# Patient Record
Sex: MALE | Race: WHITE | NOT HISPANIC OR LATINO | URBAN - METROPOLITAN AREA
[De-identification: names, ages, dates, MRNs, and addresses within clinical notes are randomized per-mention and may not be internally consistent; named-entity substitution may affect disease eponyms.]

---

## 2018-06-14 ENCOUNTER — CONVERSION ENCOUNTER (OUTPATIENT)
Dept: OTHER | Facility: HOSPITAL | Age: 78
End: 2018-06-14

## 2018-06-20 ENCOUNTER — OFFICE VISIT CONVERTED (OUTPATIENT)
Dept: OTHER | Facility: HOSPITAL | Age: 78
End: 2018-06-20
Attending: NURSE PRACTITIONER

## 2018-08-20 ENCOUNTER — OFFICE VISIT CONVERTED (OUTPATIENT)
Dept: OTHER | Facility: HOSPITAL | Age: 78
End: 2018-08-20
Attending: NURSE PRACTITIONER

## 2018-08-20 ENCOUNTER — CONVERSION ENCOUNTER (OUTPATIENT)
Dept: OTHER | Facility: HOSPITAL | Age: 78
End: 2018-08-20

## 2018-10-18 ENCOUNTER — CONVERSION ENCOUNTER (OUTPATIENT)
Dept: OTHER | Facility: HOSPITAL | Age: 78
End: 2018-10-18

## 2018-10-18 ENCOUNTER — OFFICE VISIT CONVERTED (OUTPATIENT)
Dept: OTHER | Facility: HOSPITAL | Age: 78
End: 2018-10-18
Attending: NURSE PRACTITIONER

## 2018-12-19 ENCOUNTER — OFFICE VISIT CONVERTED (OUTPATIENT)
Dept: OTHER | Facility: HOSPITAL | Age: 78
End: 2018-12-19
Attending: NURSE PRACTITIONER

## 2019-01-03 ENCOUNTER — CONVERSION ENCOUNTER (OUTPATIENT)
Dept: OTHER | Facility: HOSPITAL | Age: 79
End: 2019-01-03

## 2019-01-03 ENCOUNTER — OFFICE VISIT CONVERTED (OUTPATIENT)
Dept: OTHER | Facility: HOSPITAL | Age: 79
End: 2019-01-03
Attending: NURSE PRACTITIONER

## 2019-01-03 ENCOUNTER — HOSPITAL ENCOUNTER (OUTPATIENT)
Dept: OTHER | Facility: HOSPITAL | Age: 79
Discharge: HOME | End: 2019-01-03
Attending: NURSE PRACTITIONER

## 2019-01-03 LAB
BASOPHILS # BLD AUTO: 0.02 10*3/UL (ref 0–0.2)
BASOPHILS NFR BLD AUTO: 0.23 % (ref 0–3)
EOSINOPHIL # BLD AUTO: 0.5 10*3/UL (ref 0–0.7)
EOSINOPHIL # BLD AUTO: 4.96 % (ref 0–7)
ERYTHROCYTE [DISTWIDTH] IN BLOOD BY AUTOMATED COUNT: 19.6 % (ref 11.5–14.5)
HBA1C MFR BLD: 9.03 G/DL (ref 14–18)
HCT VFR BLD AUTO: 28.1 % (ref 42–52)
LYMPHOCYTES # BLD AUTO: 1.58 10*3/UL (ref 1–5)
MCH RBC QN AUTO: 25.4 PG (ref 27–31)
MCHC RBC AUTO-ENTMCNC: 32.2 G/DL (ref 33–37)
MCV RBC AUTO: 79 FL (ref 80–96)
MONOCYTES # BLD AUTO: 0.75 10*3/UL (ref 0.2–1.2)
MONOCYTES NFR BLD AUTO: 7.41 % (ref 3–10)
NEUTROPHILS # BLD AUTO: 7.25 10*3/UL (ref 2–8)
NEUTROPHILS NFR BLD AUTO: 71.8 % (ref 30–85)
NRBC BLD AUTO-RTO: 0 % (ref 0–0.01)
PLATELET # BLD AUTO: 339 10*3/UL (ref 130–400)
PMV BLD AUTO: 7.3 FL (ref 7.4–10.4)
RBC # BLD AUTO: 3.55 10*6/UL (ref 4.7–6.1)
VARIANT LYMPHS NFR BLD MANUAL: 15.6 % (ref 20–45)
WBC # BLD AUTO: 10.1 10*3/UL (ref 4.8–10.8)

## 2019-02-20 ENCOUNTER — OFFICE VISIT CONVERTED (OUTPATIENT)
Dept: OTHER | Facility: HOSPITAL | Age: 79
End: 2019-02-20
Attending: NURSE PRACTITIONER

## 2019-02-20 ENCOUNTER — HOSPITAL ENCOUNTER (OUTPATIENT)
Dept: OTHER | Facility: HOSPITAL | Age: 79
Discharge: HOME | End: 2019-02-20
Attending: NURSE PRACTITIONER

## 2019-02-20 LAB
BASOPHILS # BLD AUTO: 0.03 10*3/UL (ref 0–0.2)
BASOPHILS NFR BLD AUTO: 0.4 % (ref 0–3)
C3 FRG RBC-MCNC: NORMAL
CONV ABS IMM GRAN: 0.05 10*3/UL (ref 0–0.2)
CONV ANISOCYTES: NORMAL
CONV HYPOCHROMIA IN BLOOD BY LIGHT MICROSCOPY: SLIGHT
CONV IMMATURE GRAN: 0.7 % (ref 0–1.8)
DEPRECATED RDW RBC AUTO: ABNORMAL FL (ref 35.1–43.9)
EOSINOPHIL # BLD AUTO: 0.23 10*3/UL (ref 0–0.7)
EOSINOPHIL # BLD AUTO: 3.3 % (ref 0–7)
ERYTHROCYTE [DISTWIDTH] IN BLOOD BY AUTOMATED COUNT: ABNORMAL % (ref 11.6–14.4)
HBA1C MFR BLD: 11.5 G/DL (ref 14–18)
HCT VFR BLD AUTO: 38.7 % (ref 42–52)
LYMPHOCYTES # BLD AUTO: 1.49 10*3/UL (ref 1–5)
MACROCYTES BLD QL SMEAR: SLIGHT
MCH RBC QN AUTO: 27 PG (ref 27–31)
MCHC RBC AUTO-ENTMCNC: 29.7 G/DL (ref 33–37)
MCV RBC AUTO: 90.8 FL (ref 80–96)
MICROCYTES BLD QL: NORMAL
MONOCYTES # BLD AUTO: 0.59 10*3/UL (ref 0.2–1.2)
MONOCYTES NFR BLD AUTO: 8.4 % (ref 3–10)
NEUTROPHILS # BLD AUTO: 4.61 10*3/UL (ref 2–8)
NEUTROPHILS NFR BLD AUTO: 65.9 % (ref 30–85)
NRBC CBCN: 0 % (ref 0–0.7)
OVALOCYTES BLD QL SMEAR: SLIGHT
PLATELET # BLD AUTO: 306 10*3/UL (ref 130–400)
PMV BLD AUTO: 10.7 FL (ref 9.4–12.4)
POIKILOCYTOSIS BLD QL SMEAR: SLIGHT
RBC # BLD AUTO: 4.26 10*6/UL (ref 4.7–6.1)
VARIANT LYMPHS NFR BLD MANUAL: 21.3 % (ref 20–45)
WBC # BLD AUTO: 7 10*3/UL (ref 4.8–10.8)

## 2019-04-18 ENCOUNTER — HOSPITAL ENCOUNTER (OUTPATIENT)
Dept: OTHER | Facility: HOSPITAL | Age: 79
Discharge: HOME | End: 2019-04-18
Attending: NURSE PRACTITIONER

## 2019-04-18 ENCOUNTER — CONVERSION ENCOUNTER (OUTPATIENT)
Dept: OTHER | Facility: HOSPITAL | Age: 79
End: 2019-04-18

## 2019-04-18 ENCOUNTER — OFFICE VISIT CONVERTED (OUTPATIENT)
Dept: OTHER | Facility: HOSPITAL | Age: 79
End: 2019-04-18
Attending: NURSE PRACTITIONER

## 2019-04-18 LAB
BASOPHILS # BLD AUTO: 0.04 10*3/UL (ref 0–0.2)
BASOPHILS NFR BLD AUTO: 0.6 % (ref 0–3)
CONV ABS IMM GRAN: 0.09 10*3/UL (ref 0–0.2)
CONV IMMATURE GRAN: 1.3 % (ref 0–1.8)
DEPRECATED RDW RBC AUTO: 62.8 FL (ref 35.1–43.9)
EOSINOPHIL # BLD AUTO: 0.26 10*3/UL (ref 0–0.7)
EOSINOPHIL # BLD AUTO: 3.8 % (ref 0–7)
ERYTHROCYTE [DISTWIDTH] IN BLOOD BY AUTOMATED COUNT: 18.2 % (ref 11.6–14.4)
HBA1C MFR BLD: 13.3 G/DL (ref 14–18)
HCT VFR BLD AUTO: 43.2 % (ref 42–52)
LYMPHOCYTES # BLD AUTO: 1.47 10*3/UL (ref 1–5)
MCH RBC QN AUTO: 28.5 PG (ref 27–31)
MCHC RBC AUTO-ENTMCNC: 30.8 G/DL (ref 33–37)
MCV RBC AUTO: 92.7 FL (ref 80–96)
MONOCYTES # BLD AUTO: 0.55 10*3/UL (ref 0.2–1.2)
MONOCYTES NFR BLD AUTO: 8.1 % (ref 3–10)
NEUTROPHILS # BLD AUTO: 4.35 10*3/UL (ref 2–8)
NEUTROPHILS NFR BLD AUTO: 64.5 % (ref 30–85)
NRBC CBCN: 0 % (ref 0–0.7)
PLATELET # BLD AUTO: 251 10*3/UL (ref 130–400)
PMV BLD AUTO: 10.2 FL (ref 9.4–12.4)
RBC # BLD AUTO: 4.66 10*6/UL (ref 4.7–6.1)
VARIANT LYMPHS NFR BLD MANUAL: 21.7 % (ref 20–45)
WBC # BLD AUTO: 6.76 10*3/UL (ref 4.8–10.8)

## 2019-06-12 ENCOUNTER — HOSPITAL ENCOUNTER (OUTPATIENT)
Dept: OTHER | Facility: HOSPITAL | Age: 79
Discharge: HOME | End: 2019-06-12
Attending: NURSE PRACTITIONER

## 2019-06-12 ENCOUNTER — CONVERSION ENCOUNTER (OUTPATIENT)
Dept: OTHER | Facility: HOSPITAL | Age: 79
End: 2019-06-12

## 2019-06-12 ENCOUNTER — OFFICE VISIT CONVERTED (OUTPATIENT)
Dept: OTHER | Facility: HOSPITAL | Age: 79
End: 2019-06-12
Attending: NURSE PRACTITIONER

## 2019-06-12 LAB
ALBUMIN SERPL-MCNC: 4.5 G/DL (ref 3.5–5)
ALBUMIN/GLOB SERPL: 1.9 {RATIO} (ref 1.4–2.6)
ALP SERPL-CCNC: 77 U/L (ref 56–155)
ALT SERPL-CCNC: 11 U/L (ref 10–40)
ANION GAP SERPL CALC-SCNC: 21 MMOL/L (ref 8–19)
AST SERPL-CCNC: 16 U/L (ref 15–50)
BASOPHILS # BLD AUTO: 0.03 10*3/UL (ref 0–0.2)
BASOPHILS NFR BLD AUTO: 0.5 % (ref 0–3)
BILIRUB SERPL-MCNC: 0.31 MG/DL (ref 0.2–1.3)
BUN SERPL-MCNC: 21 MG/DL (ref 5–25)
BUN/CREAT SERPL: 15 {RATIO} (ref 6–20)
CALCIUM SERPL-MCNC: 9.3 MG/DL (ref 8.7–10.4)
CHLORIDE SERPL-SCNC: 102 MMOL/L (ref 99–111)
CHOLEST SERPL-MCNC: 177 MG/DL (ref 107–200)
CHOLEST/HDLC SERPL: 2.5 {RATIO} (ref 3–6)
CONV ABS IMM GRAN: 0.06 10*3/UL (ref 0–0.2)
CONV CO2: 24 MMOL/L (ref 22–32)
CONV IMMATURE GRAN: 1 % (ref 0–1.8)
CONV TOTAL PROTEIN: 6.9 G/DL (ref 6.3–8.2)
CREAT UR-MCNC: 1.41 MG/DL (ref 0.7–1.2)
DEPRECATED RDW RBC AUTO: 54.8 FL (ref 35.1–43.9)
EOSINOPHIL # BLD AUTO: 0.18 10*3/UL (ref 0–0.7)
EOSINOPHIL # BLD AUTO: 2.9 % (ref 0–7)
ERYTHROCYTE [DISTWIDTH] IN BLOOD BY AUTOMATED COUNT: 16 % (ref 11.6–14.4)
EST. AVERAGE GLUCOSE BLD GHB EST-MCNC: 137 MG/DL
GFR SERPLBLD BASED ON 1.73 SQ M-ARVRAT: 47 ML/MIN/{1.73_M2}
GLOBULIN UR ELPH-MCNC: 2.4 G/DL (ref 2–3.5)
GLUCOSE SERPL-MCNC: 160 MG/DL (ref 70–99)
HBA1C MFR BLD: 14 G/DL (ref 14–18)
HBA1C MFR BLD: 6.4 % (ref 3.5–5.7)
HCT VFR BLD AUTO: 43.5 % (ref 42–52)
HDLC SERPL-MCNC: 70 MG/DL (ref 40–60)
LDLC SERPL CALC-MCNC: 74 MG/DL (ref 70–100)
LYMPHOCYTES # BLD AUTO: 1.12 10*3/UL (ref 1–5)
MCH RBC QN AUTO: 30 PG (ref 27–31)
MCHC RBC AUTO-ENTMCNC: 32.2 G/DL (ref 33–37)
MCV RBC AUTO: 93.3 FL (ref 80–96)
MONOCYTES # BLD AUTO: 0.53 10*3/UL (ref 0.2–1.2)
MONOCYTES NFR BLD AUTO: 8.6 % (ref 3–10)
NEUTROPHILS # BLD AUTO: 4.23 10*3/UL (ref 2–8)
NEUTROPHILS NFR BLD AUTO: 68.8 % (ref 30–85)
NRBC CBCN: 0 % (ref 0–0.7)
OSMOLALITY SERPL CALC.SUM OF ELEC: 300 MOSM/KG (ref 273–304)
PLATELET # BLD AUTO: 235 10*3/UL (ref 130–400)
PMV BLD AUTO: 10.7 FL (ref 9.4–12.4)
POTASSIUM SERPL-SCNC: 4.6 MMOL/L (ref 3.5–5.3)
RBC # BLD AUTO: 4.66 10*6/UL (ref 4.7–6.1)
SODIUM SERPL-SCNC: 142 MMOL/L (ref 135–147)
T4 FREE SERPL-MCNC: 1.2 NG/DL (ref 0.9–1.8)
TRIGL SERPL-MCNC: 163 MG/DL (ref 40–150)
TSH SERPL-ACNC: 1.94 M[IU]/L (ref 0.27–4.2)
VARIANT LYMPHS NFR BLD MANUAL: 18.2 % (ref 20–45)
VLDLC SERPL-MCNC: 33 MG/DL (ref 5–37)
WBC # BLD AUTO: 6.15 10*3/UL (ref 4.8–10.8)

## 2019-08-12 ENCOUNTER — OFFICE VISIT CONVERTED (OUTPATIENT)
Dept: OTHER | Facility: HOSPITAL | Age: 79
End: 2019-08-12
Attending: NURSE PRACTITIONER

## 2019-08-12 ENCOUNTER — CONVERSION ENCOUNTER (OUTPATIENT)
Dept: OTHER | Facility: HOSPITAL | Age: 79
End: 2019-08-12

## 2019-10-03 ENCOUNTER — OFFICE VISIT CONVERTED (OUTPATIENT)
Dept: OTHER | Facility: HOSPITAL | Age: 79
End: 2019-10-03
Attending: NURSE PRACTITIONER

## 2019-10-03 ENCOUNTER — CONVERSION ENCOUNTER (OUTPATIENT)
Dept: OTHER | Facility: HOSPITAL | Age: 79
End: 2019-10-03

## 2020-01-02 ENCOUNTER — CONVERSION ENCOUNTER (OUTPATIENT)
Dept: OTHER | Facility: HOSPITAL | Age: 80
End: 2020-01-02

## 2020-01-02 ENCOUNTER — HOSPITAL ENCOUNTER (OUTPATIENT)
Dept: OTHER | Facility: HOSPITAL | Age: 80
Discharge: HOME | End: 2020-01-02
Attending: NURSE PRACTITIONER

## 2020-01-02 ENCOUNTER — OFFICE VISIT CONVERTED (OUTPATIENT)
Dept: OTHER | Facility: HOSPITAL | Age: 80
End: 2020-01-02
Attending: NURSE PRACTITIONER

## 2020-01-02 LAB
ALBUMIN SERPL-MCNC: 4.5 G/DL (ref 3.5–5)
ALBUMIN/GLOB SERPL: 2 {RATIO} (ref 1.4–2.6)
ALP SERPL-CCNC: 77 U/L (ref 56–155)
ALT SERPL-CCNC: 13 U/L (ref 10–40)
ANION GAP SERPL CALC-SCNC: 18 MMOL/L (ref 8–19)
AST SERPL-CCNC: 18 U/L (ref 15–50)
BASOPHILS # BLD AUTO: 0.06 10*3/UL (ref 0–0.2)
BASOPHILS NFR BLD AUTO: 0.8 % (ref 0–3)
BILIRUB SERPL-MCNC: 0.28 MG/DL (ref 0.2–1.3)
BUN SERPL-MCNC: 23 MG/DL (ref 5–25)
BUN/CREAT SERPL: 17 {RATIO} (ref 6–20)
CALCIUM SERPL-MCNC: 9.5 MG/DL (ref 8.7–10.4)
CHLORIDE SERPL-SCNC: 98 MMOL/L (ref 99–111)
CHOLEST SERPL-MCNC: 176 MG/DL (ref 107–200)
CHOLEST/HDLC SERPL: 3.4 {RATIO} (ref 3–6)
CONV ABS IMM GRAN: 0.08 10*3/UL (ref 0–0.2)
CONV CO2: 26 MMOL/L (ref 22–32)
CONV IMMATURE GRAN: 1.1 % (ref 0–1.8)
CONV TOTAL PROTEIN: 6.8 G/DL (ref 6.3–8.2)
CREAT UR-MCNC: 1.36 MG/DL (ref 0.7–1.2)
DEPRECATED RDW RBC AUTO: 50.6 FL (ref 35.1–43.9)
EOSINOPHIL # BLD AUTO: 0.17 10*3/UL (ref 0–0.7)
EOSINOPHIL # BLD AUTO: 2.3 % (ref 0–7)
ERYTHROCYTE [DISTWIDTH] IN BLOOD BY AUTOMATED COUNT: 14.2 % (ref 11.6–14.4)
GFR SERPLBLD BASED ON 1.73 SQ M-ARVRAT: 49 ML/MIN/{1.73_M2}
GLOBULIN UR ELPH-MCNC: 2.3 G/DL (ref 2–3.5)
GLUCOSE SERPL-MCNC: 117 MG/DL (ref 70–99)
HCT VFR BLD AUTO: 43 % (ref 42–52)
HDLC SERPL-MCNC: 52 MG/DL (ref 40–60)
HGB BLD-MCNC: 13.5 G/DL (ref 14–18)
LDLC SERPL CALC-MCNC: 71 MG/DL (ref 70–100)
LYMPHOCYTES # BLD AUTO: 1.27 10*3/UL (ref 1–5)
LYMPHOCYTES NFR BLD AUTO: 17.3 % (ref 20–45)
MCH RBC QN AUTO: 30.6 PG (ref 27–31)
MCHC RBC AUTO-ENTMCNC: 31.4 G/DL (ref 33–37)
MCV RBC AUTO: 97.5 FL (ref 80–96)
MONOCYTES # BLD AUTO: 0.53 10*3/UL (ref 0.2–1.2)
MONOCYTES NFR BLD AUTO: 7.2 % (ref 3–10)
NEUTROPHILS # BLD AUTO: 5.23 10*3/UL (ref 2–8)
NEUTROPHILS NFR BLD AUTO: 71.3 % (ref 30–85)
NRBC CBCN: 0 % (ref 0–0.7)
OSMOLALITY SERPL CALC.SUM OF ELEC: 289 MOSM/KG (ref 273–304)
PLATELET # BLD AUTO: 234 10*3/UL (ref 130–400)
PMV BLD AUTO: 10.6 FL (ref 9.4–12.4)
POTASSIUM SERPL-SCNC: 5.2 MMOL/L (ref 3.5–5.3)
RBC # BLD AUTO: 4.41 10*6/UL (ref 4.7–6.1)
SODIUM SERPL-SCNC: 137 MMOL/L (ref 135–147)
TRIGL SERPL-MCNC: 263 MG/DL (ref 40–150)
TSH SERPL-ACNC: 1.92 M[IU]/L (ref 0.27–4.2)
VLDLC SERPL-MCNC: 53 MG/DL (ref 5–37)
WBC # BLD AUTO: 7.34 10*3/UL (ref 4.8–10.8)

## 2020-04-14 ENCOUNTER — TELEPHONE CONVERTED (OUTPATIENT)
Dept: OTHER | Facility: HOSPITAL | Age: 80
End: 2020-04-14
Attending: NURSE PRACTITIONER

## 2020-06-11 ENCOUNTER — CONVERSION ENCOUNTER (OUTPATIENT)
Dept: OTHER | Facility: HOSPITAL | Age: 80
End: 2020-06-11

## 2020-06-11 ENCOUNTER — OFFICE VISIT CONVERTED (OUTPATIENT)
Dept: OTHER | Facility: HOSPITAL | Age: 80
End: 2020-06-11
Attending: NURSE PRACTITIONER

## 2020-06-11 ENCOUNTER — HOSPITAL ENCOUNTER (OUTPATIENT)
Dept: OTHER | Facility: HOSPITAL | Age: 80
Discharge: HOME | End: 2020-06-11
Attending: NURSE PRACTITIONER

## 2020-06-11 LAB
ALBUMIN SERPL-MCNC: 4.5 G/DL (ref 3.5–5)
ALBUMIN/GLOB SERPL: 2.1 {RATIO} (ref 1.4–2.6)
ALP SERPL-CCNC: 73 U/L (ref 56–155)
ALT SERPL-CCNC: 13 U/L (ref 10–40)
ANION GAP SERPL CALC-SCNC: 19 MMOL/L (ref 8–19)
AST SERPL-CCNC: 22 U/L (ref 15–50)
BASOPHILS # BLD AUTO: 0.04 10*3/UL (ref 0–0.2)
BASOPHILS NFR BLD AUTO: 0.7 % (ref 0–3)
BILIRUB SERPL-MCNC: 0.37 MG/DL (ref 0.2–1.3)
BUN SERPL-MCNC: 27 MG/DL (ref 5–25)
BUN/CREAT SERPL: 19 {RATIO} (ref 6–20)
CALCIUM SERPL-MCNC: 9.5 MG/DL (ref 8.7–10.4)
CHLORIDE SERPL-SCNC: 101 MMOL/L (ref 99–111)
CHOLEST SERPL-MCNC: 191 MG/DL (ref 107–200)
CHOLEST/HDLC SERPL: 3.2 {RATIO} (ref 3–6)
CONV ABS IMM GRAN: 0.09 10*3/UL (ref 0–0.2)
CONV CO2: 23 MMOL/L (ref 22–32)
CONV IMMATURE GRAN: 1.6 % (ref 0–1.8)
CONV TOTAL PROTEIN: 6.6 G/DL (ref 6.3–8.2)
CREAT UR-MCNC: 1.44 MG/DL (ref 0.7–1.2)
DEPRECATED RDW RBC AUTO: 51.8 FL (ref 35.1–43.9)
EOSINOPHIL # BLD AUTO: 0.16 10*3/UL (ref 0–0.7)
EOSINOPHIL # BLD AUTO: 2.8 % (ref 0–7)
ERYTHROCYTE [DISTWIDTH] IN BLOOD BY AUTOMATED COUNT: 14.5 % (ref 11.6–14.4)
EST. AVERAGE GLUCOSE BLD GHB EST-MCNC: 137 MG/DL
GFR SERPLBLD BASED ON 1.73 SQ M-ARVRAT: 45 ML/MIN/{1.73_M2}
GLOBULIN UR ELPH-MCNC: 2.1 G/DL (ref 2–3.5)
GLUCOSE SERPL-MCNC: 143 MG/DL (ref 70–99)
HBA1C MFR BLD: 6.4 % (ref 3.5–5.7)
HCT VFR BLD AUTO: 42.8 % (ref 42–52)
HDLC SERPL-MCNC: 59 MG/DL (ref 40–60)
HGB BLD-MCNC: 13.4 G/DL (ref 14–18)
LDLC SERPL CALC-MCNC: 80 MG/DL (ref 70–100)
LYMPHOCYTES # BLD AUTO: 0.93 10*3/UL (ref 1–5)
LYMPHOCYTES NFR BLD AUTO: 16.3 % (ref 20–45)
MCH RBC QN AUTO: 30.2 PG (ref 27–31)
MCHC RBC AUTO-ENTMCNC: 31.3 G/DL (ref 33–37)
MCV RBC AUTO: 96.4 FL (ref 80–96)
MONOCYTES # BLD AUTO: 0.47 10*3/UL (ref 0.2–1.2)
MONOCYTES NFR BLD AUTO: 8.2 % (ref 3–10)
NEUTROPHILS # BLD AUTO: 4.03 10*3/UL (ref 2–8)
NEUTROPHILS NFR BLD AUTO: 70.4 % (ref 30–85)
NRBC CBCN: 0 % (ref 0–0.7)
OSMOLALITY SERPL CALC.SUM OF ELEC: 294 MOSM/KG (ref 273–304)
PLATELET # BLD AUTO: 219 10*3/UL (ref 130–400)
PMV BLD AUTO: 10.7 FL (ref 9.4–12.4)
POTASSIUM SERPL-SCNC: 5.1 MMOL/L (ref 3.5–5.3)
RBC # BLD AUTO: 4.44 10*6/UL (ref 4.7–6.1)
SODIUM SERPL-SCNC: 138 MMOL/L (ref 135–147)
TRIGL SERPL-MCNC: 259 MG/DL (ref 40–150)
VLDLC SERPL-MCNC: 52 MG/DL (ref 5–37)
WBC # BLD AUTO: 5.72 10*3/UL (ref 4.8–10.8)

## 2021-05-12 NOTE — PROGRESS NOTES
Quick Note      Patient Name: Arron Valencia   Patient ID: [id number]   Sex: Male   YOB: 1940    Primary Care Provider: Shanti BAILEY    Visit Date: April 14, 2020    Provider: LEO Vazquez   Location: [facility]   Location Address: [address] [place] [address]  [id number]   Location Phone: [phone]          History Of Present Illness  TELEHEALTH VISIT  Chief Complaint: Follow up   Arron Valencia is a 80 year old /White male who is presenting for evaluation via telehealth visit. Verbal consent obtained before beginning visit.   Provider spent 11 minutes with the patient during telehealth visit.   The following staff were present during this visit: LEO Cooney and Munira Keith CMA   Past Medical History/Overview of Patient Symptoms     *Patient consented to consult via telephone.     Follow up on Lumbar Degenerative disc disease, Neuropathy. Complains of right knee pain and trouble walking for few days. Using walking cane to help ambulate. Wife states looks swollen. Hurts worse with walking, has not done anything to hurt it that he knows of. Has been using icy hot and has not been helping. He is on routine Hydrocodone and Gabapentin for his CLBP and DDD with improvement but has not been helping his knee much.   Request hydrocodone/ gabapentin refill. Jairo ordered, last drug screen was 4/2019, patient is compliant with meds we will get his UDS at his next in office visit, with his age and the COVID-19 pandemic I do not want to pull him in at this time.    Time In: 1419  Time Out:1430           Assessment  · DDD (degenerative disc disease), lumbar     722.52/M51.36  · Neuropathy involving both lower extremities     356.9/G57.93  · Chronic low back pain       Low back pain     724.2/M54.5  Other chronic pain     724.2/G89.29  · Right knee pain     719.46/M25.561    Problems Reconciled  Plan  · Orders  o Physician Telephone Evaluation, 11-20 minutes  (15411) - - 04/29/2020  · Medications  o gabapentin 600 mg oral tablet   SIG: take 1 tablet with breakfast and lunch and 2 tablets at night.   DISP: (120) tablet with 2 refills  Refilled on 04/14/2020     o hydrocodone-acetaminophen 7.5-325 mg oral tablet   SIG: take 1 tablet by oral route every 6 hours as needed for pain for 30 days   DISP: (120) tablet with 0 refills  Refilled on 04/14/2020     o Medications have been Reconciled  o Transition of Care or Provider Policy  · Instructions  o Plan Of Care: At this time we will continue his hydrocodone and gabapentin, advised patient that on bad days he can use Aleve or ibuprofen for his knee pain. Once the COVID-19 pandemic is over we can consider referring him to orthopedics for an evaluation. Patient and wife both voiced understanding.  o Patient was educated/instructed on their diagnosis, treatment and medications prior to discharge from the clinic today.  o Patient is taking medications as prescribed and doing well.   o Call the office with any concerns or questions.  o Controlled substance agreement has been signed. Urine drug screen and Jairo reports have been reviewed and there are no discrepancies. Patient has denied side effect of medications. Patient advised to keep medications in original prescription bottle. Patient is to keep medications in a locked area away from children. Advised not to drive or operate heavy machinery when taking medications.  · Disposition  o Follow up in 2 months            Electronically Signed by: LEO Vazquez -Author on April 29, 2020 08:47:57 AM

## 2021-05-13 NOTE — PROGRESS NOTES
Progress Note      Patient Name: Arron Valencia   Patient ID: [id number]   Sex: Male   YOB: 1940    Primary Care Provider: Shanti BAILEY    Visit Date: June 11, 2020    Provider: LEO Vazquez   Location: [facility]   Location Address: [address] [place] [address]  [id number]   Location Phone: [phone]          Chief Complaint  · Follow up      History Of Present Illness  Arron Valencia is a 80 year old /White male who presents for evaluation and treatment of:      Follow up on Hyperlipidemia, chronic Afib, Lumbar DDD, Chronic low back pain, Neuropathy, CKD. Patient does have chronic atrial fibrillation he is on a baby aspirin, is a history of GI bleed.  He is rate controlled with sotalol blood pressure and heart rate are both under good control today.  Patient is on metformin for diabetes last A1c was 6.4.  He is on pravastatin for hyperlipidemia tolerates well denies side effects.    Patient does have chronic low back pain related to degenerative disc disease.  Is currently managed with hydrocodone and gabapentin.  Tolerates medication well and he denies any side effects.  Does ambulate with a cane.  We will update both Jairo and drug screen today.    Requesting refill on Hydrocodone.     [facility], needs drug screen.          Past Medical History  Disease Name Date Onset Notes   AAA (abdominal aortic aneurysm) --  --    Atrial Fibrillation --  --    Cancer --  Neck   Chronic atrial fibrillation 12/19/2018 --    Chronic low back pain 04/29/2020 --    CKD (chronic kidney disease) --  --    Colorectal cancer --  --    DDD (degenerative disc disease), lumbar 08/22/2018 --    DM2 (diabetes mellitus, type 2) --  --    Hyperlipidemia 12/19/2018 --    Hypotension --  --    Neck pain --  --    Neuropathy involving both lower extremities 02/20/2019 --    Personal history of colon cancer 02/20/2019 --    Pneumonia --  --          Past Surgical History  Procedure Name  Date Notes   Cataract surgery --  Bilateral   Colon Surgery --  Cancer   Colonoscopy 2015 --          Medication List  Name Date Started Instructions   ascorbic acid (vitamin C) 500 mg oral tablet  take 0.5 tablet by oral route daily for 30 days   aspirin 81 mg oral tablet,delayed release (DR/EC)  take 1 tablet (81 mg) by oral route once daily   ferrous sulfate 325 mg (65 mg iron) oral tablet  take 1 tablet (325 mg) by oral route once daily for 30 days   gabapentin 600 mg oral tablet 2020 take 1 tablet with breakfast and lunch and 2 tablets at night.   hydrocodone-acetaminophen 7.5-325 mg oral tablet 2020 take 1 tablet by oral route every 6 hours as needed for pain for 30 days   metformin 1,000 mg oral tablet 2019 TAKE 1 TABLET(1000 MG) BY MOUTH TWICE DAILY WITH THE MORNING AND EVENING MEAL   mineral oil oral oil  --    Nitrostat 0.4 mg sublingual tablet, sublingual  place 1 tablet (0.4 mg) by buccal route at the first sign of an attack; no more than 3 tabs are recommended within a 15 minute period.   omeprazole 20 mg oral capsule,delayed release(DR/EC) 2020 take 1 capsule by oral route 3 times a day as needed for 90 days   pravastatin 40 mg oral tablet 2019 TAKE 1 TABLET(40 MG) BY MOUTH EVERY DAY AT BEDTIME   Prodigy Lancets 26 gauge miscellaneous misc 2018 use as directed for 30 days   Prodigy No Coding miscellaneous strip 2018 use as directed for 30 days   sotalol 80 mg oral tablet 2020 take 0.5 tablet (40 mg) by oral route QHS for 30 days         Allergy List  Allergen Name Date Reaction Notes   Actos --  --  --    AVANDAMET  --  --    Crestor --  --  --        Allergies Reconciled  Family Medical History  Disease Name Relative/Age Notes   Heart Disease Father/   Father had a pacemaker and is    Diabetes mellitus, type II Mother/   --          Social History  Finding Status Start/Stop Quantity Notes   Alcohol Light --/-- --  2018   Denies  "illicit substance abuse Never --/-- --  5/21/2018   Denies substance abuse Never --/-- --  5/21/2018   Tobacco Current every day --/-- --  chew only         Immunizations  NameDate Admin Mfg Trade Name Lot Number Route Inj VIS Given VIS Publication   Ipiuqogro11/03/2019 PMC Fluzone Quadrivalent [id number] IM  10/03/2019    Comments:    Qisvhvkvd45/03/2019 PMC Fluzone Quadrivalent [id number] IM  10/03/2019    Comments:    Vquarpglh27/18/2018 SKB Fluzone Quadrivalent [id number] IM RD 10/18/2018 08/07/2015   Comments:    Bvhgjzbih37/02/2017 OTH Unknown TradeName [id number] IM LD 10/02/2017    Comments: NDC# [id number] Given per Amanda Eastman RN.   Xjqitmjli2516/02/2020 MSD PNEUMOVAX 23 [id number] IM RD 01/02/2020    Comments:    Prevnar 1310/01/2016 NE Not Entered  NE NE 10/01/2016    Comments:          Review of Systems  · Constitutional  o Denies  o : fatigue, weakness  · Cardiovascular  o Denies  o : dypnea on exertion, pain in chest  · Respiratory  o Denies  o : shortness of breath, cough  · Gastrointestinal  o Denies  o : diarrhea, constipation  · Genitourinary  o Denies  o : urgency, frequency  · Musculoskeletal  o Admits  o : muscle pain, back pain, hip pain      Vitals  Date Time BP Position Site L\R Cuff Size HR RR TEMP (F) WT  HT  BMI kg/m2 BSA m2 O2 Sat        06/11/2020 10:28 /84 Sitting    84 - R 16 98.8 173lbs 0oz 6'  3\" 21.62 2.04 95 %          Physical Examination  · Constitutional  o Appearance  o : well-nourished, well developed, alert, in no acute distress, well-tended appearance  · Head and Face  o Head  o :   § Inspection  § : atraumatic, normocephalic  · Eyes  o Eyes  o : extraocular movements intact, no scleral icterus, no conjunctival injection  · Respiratory  o Respiratory Effort  o : breathing comfortably, symmetric chest rise  o Auscultation of Lungs  o : clear to asculatation bilaterally, no wheezes, rales, or rhonchii  · Cardiovascular  o Heart  o :   § Auscultation of Heart  § : regular rate " and rhythm, no murmurs, rubs, or gallops  o Peripheral Vascular System  o :   § Extremities  § : no edema  · Gastrointestinal  o Abdominal Examination  o :   § Abdomen  § : bowel sounds present, non-distended, non-tender  · Skin and Subcutaneous Tissue  o General Inspection  o : no lesions present, no areas of discoloration, skin turgor normal  · Neurologic  o Mental Status Examination  o :   § Orientation  § : grossly oriented to person, place and time  o Gait and Station  o :   § Gait Screening  § : Ambulates with cane  · Psychiatric  o General  o : normal mood and affect          Assessment  · Diabetes mellitus, type 2     250.00/E11.9  · Hyperlipidemia     272.4/E78.5  · Chronic atrial fibrillation     427.31/I48.20  · DDD (degenerative disc disease), lumbar     722.52/M51.36  · Neuropathy involving both lower extremities     356.9/G57.93    Problems Reconciled  Plan  · Orders  o Diabetes 1 Panel (CMP, Lipid, A1c) [facility] (69387, 66065, 19417) - 250.00/E11.9 - 06/11/2020  o CBC with Auto Diff [facility] (70491) - 250.00/E11.9 - 06/11/2020  o ACO-39: Current medications updated and reviewed () - - 06/11/2020  o ACO-13: Fall Risk Screening with no falls in past year or only one fall without injury in the past year (1101F) - - 06/11/2020  o ACO - Pt declines to or was not able to provide an Advance Care Plan or name a Surrogate Decision Maker (1124F) - - 06/11/2020  o Drug Screen ([facility] send out to [facility] only) - 356.9/G57.93 - 06/11/2020  · Medications  o hydrocodone-acetaminophen 7.5-325 mg oral tablet   SIG: take 1 tablet by oral route every 6 hours as needed for pain for 30 days   DISP: (120) tablet with 0 refills  Refilled on 06/11/2020     o Medications have been Reconciled  o Transition of Care or Provider Policy  · Instructions  o Patient is taking medications as prescribed and doing well.   o Patient was educated/instructed on their diagnosis, treatment and medications prior to discharge from the clinic  today.  o Call the office with any concerns or questions.  o Controlled substance agreement has been signed. Urine drug screen and Jairo reports have been reviewed and there are no discrepancies. Patient has denied side effect of medications. Patient advised to keep medications in original prescription bottle. Patient is to keep medications in a locked area away from children. Advised not to drive or operate heavy machinery when taking medications.  · Disposition  o Follow Up in 3 months     EMR dragon/transcription disclaimer: Much of this encounter note is an electronic transcription/translation of spoken language to printed text.  Electronic translation of spoken language may permit erroneous, or at times nonsensical words or phrases to be inadvertently transcribed; although I have reviewed the note for such errors, some may still exist.             Electronically Signed by: LEO Vazquez -Author on June 11, 2020 10:08:44 AM

## 2021-05-15 VITALS
HEIGHT: 73 IN | BODY MASS INDEX: 22.4 KG/M2 | HEART RATE: 82 BPM | RESPIRATION RATE: 16 BRPM | OXYGEN SATURATION: 92 % | SYSTOLIC BLOOD PRESSURE: 100 MMHG | WEIGHT: 169 LBS | TEMPERATURE: 98.5 F | DIASTOLIC BLOOD PRESSURE: 60 MMHG

## 2021-05-15 VITALS
HEART RATE: 84 BPM | WEIGHT: 173 LBS | TEMPERATURE: 98.8 F | RESPIRATION RATE: 16 BRPM | BODY MASS INDEX: 21.51 KG/M2 | OXYGEN SATURATION: 95 % | HEIGHT: 75 IN | DIASTOLIC BLOOD PRESSURE: 84 MMHG | SYSTOLIC BLOOD PRESSURE: 126 MMHG

## 2021-05-15 VITALS
BODY MASS INDEX: 22 KG/M2 | DIASTOLIC BLOOD PRESSURE: 70 MMHG | SYSTOLIC BLOOD PRESSURE: 108 MMHG | RESPIRATION RATE: 16 BRPM | OXYGEN SATURATION: 94 % | HEIGHT: 73 IN | TEMPERATURE: 98.4 F | WEIGHT: 166 LBS | HEART RATE: 83 BPM

## 2021-05-15 VITALS
RESPIRATION RATE: 18 BRPM | TEMPERATURE: 97.7 F | HEIGHT: 75 IN | HEART RATE: 76 BPM | WEIGHT: 171 LBS | DIASTOLIC BLOOD PRESSURE: 60 MMHG | OXYGEN SATURATION: 93 % | BODY MASS INDEX: 21.26 KG/M2 | SYSTOLIC BLOOD PRESSURE: 102 MMHG

## 2021-05-15 VITALS
HEIGHT: 73 IN | HEART RATE: 97 BPM | DIASTOLIC BLOOD PRESSURE: 68 MMHG | OXYGEN SATURATION: 94 % | WEIGHT: 166 LBS | RESPIRATION RATE: 18 BRPM | BODY MASS INDEX: 22 KG/M2 | SYSTOLIC BLOOD PRESSURE: 108 MMHG | TEMPERATURE: 98.4 F

## 2021-05-15 VITALS
WEIGHT: 173.25 LBS | HEIGHT: 75 IN | TEMPERATURE: 98.5 F | SYSTOLIC BLOOD PRESSURE: 122 MMHG | HEART RATE: 89 BPM | OXYGEN SATURATION: 94 % | DIASTOLIC BLOOD PRESSURE: 60 MMHG | BODY MASS INDEX: 21.54 KG/M2 | RESPIRATION RATE: 16 BRPM

## 2021-05-16 VITALS
DIASTOLIC BLOOD PRESSURE: 48 MMHG | RESPIRATION RATE: 16 BRPM | WEIGHT: 159 LBS | TEMPERATURE: 98.7 F | HEIGHT: 73 IN | OXYGEN SATURATION: 94 % | BODY MASS INDEX: 21.07 KG/M2 | HEART RATE: 94 BPM | SYSTOLIC BLOOD PRESSURE: 82 MMHG

## 2021-05-16 VITALS
HEART RATE: 76 BPM | RESPIRATION RATE: 16 BRPM | TEMPERATURE: 97.1 F | SYSTOLIC BLOOD PRESSURE: 102 MMHG | BODY MASS INDEX: 21.47 KG/M2 | WEIGHT: 162 LBS | OXYGEN SATURATION: 94 % | HEIGHT: 73 IN | DIASTOLIC BLOOD PRESSURE: 64 MMHG

## 2021-05-16 VITALS
HEART RATE: 91 BPM | OXYGEN SATURATION: 94 % | TEMPERATURE: 97.2 F | DIASTOLIC BLOOD PRESSURE: 60 MMHG | WEIGHT: 160 LBS | SYSTOLIC BLOOD PRESSURE: 115 MMHG | RESPIRATION RATE: 16 BRPM | BODY MASS INDEX: 21.2 KG/M2 | HEIGHT: 73 IN

## 2021-05-16 VITALS
SYSTOLIC BLOOD PRESSURE: 120 MMHG | OXYGEN SATURATION: 93 % | TEMPERATURE: 97.6 F | WEIGHT: 160 LBS | BODY MASS INDEX: 21.2 KG/M2 | RESPIRATION RATE: 16 BRPM | DIASTOLIC BLOOD PRESSURE: 60 MMHG | HEIGHT: 73 IN | HEART RATE: 92 BPM

## 2021-05-16 VITALS
RESPIRATION RATE: 16 BRPM | SYSTOLIC BLOOD PRESSURE: 100 MMHG | HEIGHT: 73 IN | TEMPERATURE: 97.6 F | WEIGHT: 162 LBS | OXYGEN SATURATION: 95 % | DIASTOLIC BLOOD PRESSURE: 64 MMHG | BODY MASS INDEX: 21.47 KG/M2 | HEART RATE: 80 BPM

## 2021-05-16 VITALS
TEMPERATURE: 98.2 F | OXYGEN SATURATION: 96 % | BODY MASS INDEX: 21.6 KG/M2 | DIASTOLIC BLOOD PRESSURE: 62 MMHG | RESPIRATION RATE: 18 BRPM | WEIGHT: 163 LBS | HEIGHT: 73 IN | SYSTOLIC BLOOD PRESSURE: 102 MMHG | HEART RATE: 80 BPM

## 2021-05-16 VITALS
SYSTOLIC BLOOD PRESSURE: 112 MMHG | DIASTOLIC BLOOD PRESSURE: 62 MMHG | RESPIRATION RATE: 16 BRPM | HEART RATE: 76 BPM | WEIGHT: 163 LBS | BODY MASS INDEX: 21.6 KG/M2 | OXYGEN SATURATION: 96 % | TEMPERATURE: 98.2 F | HEIGHT: 73 IN

## 2024-06-19 ENCOUNTER — APPOINTMENT (OUTPATIENT)
Dept: GENERAL RADIOLOGY | Facility: HOSPITAL | Age: 84
DRG: 192 | End: 2024-06-19
Payer: MEDICARE

## 2024-06-19 ENCOUNTER — HOSPITAL ENCOUNTER (INPATIENT)
Facility: HOSPITAL | Age: 84
LOS: 1 days | Discharge: HOME | DRG: 192 | End: 2024-06-23
Attending: EMERGENCY MEDICINE | Admitting: STUDENT IN AN ORGANIZED HEALTH CARE EDUCATION/TRAINING PROGRAM
Payer: MEDICARE

## 2024-06-19 DIAGNOSIS — R26.2 DIFFICULTY WALKING: ICD-10-CM

## 2024-06-19 DIAGNOSIS — R53.1 WEAKNESS: Primary | ICD-10-CM

## 2024-06-19 DIAGNOSIS — E86.0 DEHYDRATION: ICD-10-CM

## 2024-06-19 DIAGNOSIS — J18.9 ATYPICAL PNEUMONIA: ICD-10-CM

## 2024-06-19 DIAGNOSIS — D64.9 ANEMIA, UNSPECIFIED TYPE: ICD-10-CM

## 2024-06-19 LAB
ALBUMIN SERPL-MCNC: 3.6 G/DL (ref 3.5–5.2)
ALBUMIN/GLOB SERPL: 1.6 G/DL
ALP SERPL-CCNC: 76 U/L (ref 39–117)
ALT SERPL W P-5'-P-CCNC: 14 U/L (ref 1–41)
ANION GAP SERPL CALCULATED.3IONS-SCNC: 11.4 MMOL/L (ref 5–15)
AST SERPL-CCNC: 29 U/L (ref 1–40)
BACTERIA UR QL AUTO: NORMAL /HPF
BILIRUB SERPL-MCNC: 0.2 MG/DL (ref 0–1.2)
BILIRUB UR QL STRIP: NEGATIVE
BUN SERPL-MCNC: 39 MG/DL (ref 8–23)
BUN/CREAT SERPL: 25.5 (ref 7–25)
CALCIUM SPEC-SCNC: 8.2 MG/DL (ref 8.6–10.5)
CHLORIDE SERPL-SCNC: 98 MMOL/L (ref 98–107)
CLARITY UR: CLEAR
CO2 SERPL-SCNC: 24.6 MMOL/L (ref 22–29)
COLOR UR: YELLOW
CREAT SERPL-MCNC: 1.53 MG/DL (ref 0.76–1.27)
DEPRECATED RDW RBC AUTO: 49.8 FL (ref 37–54)
EGFRCR SERPLBLD CKD-EPI 2021: 44.3 ML/MIN/1.73
ERYTHROCYTE [DISTWIDTH] IN BLOOD BY AUTOMATED COUNT: 16.1 % (ref 12.3–15.4)
GLOBULIN UR ELPH-MCNC: 2.3 GM/DL
GLUCOSE SERPL-MCNC: 102 MG/DL (ref 65–99)
GLUCOSE UR STRIP-MCNC: NEGATIVE MG/DL
HCT VFR BLD AUTO: 26.2 % (ref 37.5–51)
HGB BLD-MCNC: 8.3 G/DL (ref 13–17.7)
HGB UR QL STRIP.AUTO: ABNORMAL
HOLD SPECIMEN: NORMAL
HOLD SPECIMEN: NORMAL
HYALINE CASTS UR QL AUTO: NORMAL /LPF
KETONES UR QL STRIP: NEGATIVE
LEUKOCYTE ESTERASE UR QL STRIP.AUTO: NEGATIVE
LYMPHOCYTES # BLD MANUAL: 0.42 10*3/MM3 (ref 0.7–3.1)
LYMPHOCYTES NFR BLD MANUAL: 13 % (ref 5–12)
MAGNESIUM SERPL-MCNC: 2 MG/DL (ref 1.6–2.4)
MCH RBC QN AUTO: 26.8 PG (ref 26.6–33)
MCHC RBC AUTO-ENTMCNC: 31.7 G/DL (ref 31.5–35.7)
MCV RBC AUTO: 84.5 FL (ref 79–97)
MONOCYTES # BLD: 0.6 10*3/MM3 (ref 0.1–0.9)
MYELOCYTES NFR BLD MANUAL: 4 % (ref 0–0)
NEUTROPHILS # BLD AUTO: 3.42 10*3/MM3 (ref 1.7–7)
NEUTROPHILS NFR BLD MANUAL: 74 % (ref 42.7–76)
NITRITE UR QL STRIP: NEGATIVE
PH UR STRIP.AUTO: 5.5 [PH] (ref 5–8)
PLATELET # BLD AUTO: 185 10*3/MM3 (ref 140–450)
PMV BLD AUTO: 9.7 FL (ref 6–12)
POTASSIUM SERPL-SCNC: 4.4 MMOL/L (ref 3.5–5.2)
PROT SERPL-MCNC: 5.9 G/DL (ref 6–8.5)
PROT UR QL STRIP: ABNORMAL
RBC # BLD AUTO: 3.1 10*6/MM3 (ref 4.14–5.8)
RBC # UR STRIP: NORMAL /HPF
RBC MORPH BLD: NORMAL
REF LAB TEST METHOD: NORMAL
SMALL PLATELETS BLD QL SMEAR: ADEQUATE
SODIUM SERPL-SCNC: 134 MMOL/L (ref 136–145)
SP GR UR STRIP: 1.02 (ref 1–1.03)
SQUAMOUS #/AREA URNS HPF: NORMAL /HPF
TROPONIN T SERPL HS-MCNC: 46 NG/L
UROBILINOGEN UR QL STRIP: ABNORMAL
VARIANT LYMPHS NFR BLD MANUAL: 9 % (ref 19.6–45.3)
WBC # UR STRIP: NORMAL /HPF
WBC MORPH BLD: NORMAL
WBC NRBC COR # BLD AUTO: 4.62 10*3/MM3 (ref 3.4–10.8)
WHOLE BLOOD HOLD COAG: NORMAL
WHOLE BLOOD HOLD SPECIMEN: NORMAL

## 2024-06-19 PROCEDURE — 83880 ASSAY OF NATRIURETIC PEPTIDE: CPT | Performed by: STUDENT IN AN ORGANIZED HEALTH CARE EDUCATION/TRAINING PROGRAM

## 2024-06-19 PROCEDURE — 85025 COMPLETE CBC W/AUTO DIFF WBC: CPT | Performed by: EMERGENCY MEDICINE

## 2024-06-19 PROCEDURE — 82607 VITAMIN B-12: CPT | Performed by: STUDENT IN AN ORGANIZED HEALTH CARE EDUCATION/TRAINING PROGRAM

## 2024-06-19 PROCEDURE — 93005 ELECTROCARDIOGRAM TRACING: CPT | Performed by: EMERGENCY MEDICINE

## 2024-06-19 PROCEDURE — 82746 ASSAY OF FOLIC ACID SERUM: CPT | Performed by: STUDENT IN AN ORGANIZED HEALTH CARE EDUCATION/TRAINING PROGRAM

## 2024-06-19 PROCEDURE — 93010 ELECTROCARDIOGRAM REPORT: CPT | Performed by: INTERNAL MEDICINE

## 2024-06-19 PROCEDURE — 71045 X-RAY EXAM CHEST 1 VIEW: CPT

## 2024-06-19 PROCEDURE — 83735 ASSAY OF MAGNESIUM: CPT | Performed by: EMERGENCY MEDICINE

## 2024-06-19 PROCEDURE — 85007 BL SMEAR W/DIFF WBC COUNT: CPT | Performed by: EMERGENCY MEDICINE

## 2024-06-19 PROCEDURE — 84484 ASSAY OF TROPONIN QUANT: CPT | Performed by: EMERGENCY MEDICINE

## 2024-06-19 PROCEDURE — 87086 URINE CULTURE/COLONY COUNT: CPT | Performed by: EMERGENCY MEDICINE

## 2024-06-19 PROCEDURE — 36415 COLL VENOUS BLD VENIPUNCTURE: CPT

## 2024-06-19 PROCEDURE — 82728 ASSAY OF FERRITIN: CPT | Performed by: STUDENT IN AN ORGANIZED HEALTH CARE EDUCATION/TRAINING PROGRAM

## 2024-06-19 PROCEDURE — 84466 ASSAY OF TRANSFERRIN: CPT | Performed by: STUDENT IN AN ORGANIZED HEALTH CARE EDUCATION/TRAINING PROGRAM

## 2024-06-19 PROCEDURE — 81001 URINALYSIS AUTO W/SCOPE: CPT | Performed by: EMERGENCY MEDICINE

## 2024-06-19 PROCEDURE — 83540 ASSAY OF IRON: CPT | Performed by: STUDENT IN AN ORGANIZED HEALTH CARE EDUCATION/TRAINING PROGRAM

## 2024-06-19 PROCEDURE — 25810000003 SODIUM CHLORIDE 0.9 % SOLUTION: Performed by: EMERGENCY MEDICINE

## 2024-06-19 PROCEDURE — 84145 PROCALCITONIN (PCT): CPT | Performed by: STUDENT IN AN ORGANIZED HEALTH CARE EDUCATION/TRAINING PROGRAM

## 2024-06-19 PROCEDURE — 80053 COMPREHEN METABOLIC PANEL: CPT | Performed by: EMERGENCY MEDICINE

## 2024-06-19 PROCEDURE — 99285 EMERGENCY DEPT VISIT HI MDM: CPT

## 2024-06-19 PROCEDURE — 84443 ASSAY THYROID STIM HORMONE: CPT | Performed by: STUDENT IN AN ORGANIZED HEALTH CARE EDUCATION/TRAINING PROGRAM

## 2024-06-19 RX ORDER — GABAPENTIN 600 MG/1
600 TABLET ORAL 3 TIMES DAILY
COMMUNITY
Start: 2024-05-30

## 2024-06-19 RX ORDER — OMEPRAZOLE 20 MG/1
20 CAPSULE, DELAYED RELEASE ORAL DAILY
COMMUNITY
Start: 2024-04-18

## 2024-06-19 RX ORDER — BUDESONIDE, GLYCOPYRROLATE, AND FORMOTEROL FUMARATE 160; 9; 4.8 UG/1; UG/1; UG/1
2 AEROSOL, METERED RESPIRATORY (INHALATION) 2 TIMES DAILY
COMMUNITY
Start: 2024-05-14

## 2024-06-19 RX ORDER — SODIUM CHLORIDE 0.9 % (FLUSH) 0.9 %
10 SYRINGE (ML) INJECTION AS NEEDED
Status: DISCONTINUED | OUTPATIENT
Start: 2024-06-19 | End: 2024-06-23 | Stop reason: HOSPADM

## 2024-06-19 RX ORDER — PRAVASTATIN SODIUM 40 MG
40 TABLET ORAL NIGHTLY
COMMUNITY
Start: 2023-07-19 | End: 2024-07-19

## 2024-06-19 RX ORDER — ASPIRIN 81 MG/1
81 TABLET ORAL DAILY
COMMUNITY

## 2024-06-19 RX ORDER — HYDROCODONE BITARTRATE AND ACETAMINOPHEN 7.5; 325 MG/1; MG/1
1 TABLET ORAL 2 TIMES DAILY PRN
COMMUNITY
Start: 2024-03-25

## 2024-06-19 RX ORDER — SOTALOL HYDROCHLORIDE 80 MG/1
40 TABLET ORAL 2 TIMES DAILY
COMMUNITY
Start: 2024-01-23

## 2024-06-19 RX ADMIN — SODIUM CHLORIDE 1000 ML: 9 INJECTION, SOLUTION INTRAVENOUS at 22:34

## 2024-06-20 ENCOUNTER — APPOINTMENT (OUTPATIENT)
Dept: CT IMAGING | Facility: HOSPITAL | Age: 84
DRG: 192 | End: 2024-06-20
Payer: MEDICARE

## 2024-06-20 ENCOUNTER — APPOINTMENT (OUTPATIENT)
Dept: CARDIOLOGY | Facility: HOSPITAL | Age: 84
DRG: 192 | End: 2024-06-20
Payer: MEDICARE

## 2024-06-20 PROBLEM — R53.1 GENERALIZED WEAKNESS: Status: ACTIVE | Noted: 2024-06-20

## 2024-06-20 LAB
ANION GAP SERPL CALCULATED.3IONS-SCNC: 9.8 MMOL/L (ref 5–15)
ASCENDING AORTA: 3.1 CM
BASOPHILS # BLD AUTO: 0.03 10*3/MM3 (ref 0–0.2)
BASOPHILS NFR BLD AUTO: 0.7 % (ref 0–1.5)
BH CV ECHO MEAS - AO MAX PG: 4.6 MMHG
BH CV ECHO MEAS - AO MEAN PG: 2 MMHG
BH CV ECHO MEAS - AO ROOT DIAM: 2.9 CM
BH CV ECHO MEAS - AO V2 MAX: 107 CM/SEC
BH CV ECHO MEAS - AO V2 VTI: 23.6 CM
BH CV ECHO MEAS - AVA(I,D): 1.69 CM2
BH CV ECHO MEAS - EDV(CUBED): 101.2 ML
BH CV ECHO MEAS - EDV(MOD-SP2): 74.9 ML
BH CV ECHO MEAS - EDV(MOD-SP4): 96.1 ML
BH CV ECHO MEAS - EF(MOD-BP): 45.3 %
BH CV ECHO MEAS - EF(MOD-SP2): 46.2 %
BH CV ECHO MEAS - EF(MOD-SP4): 45.6 %
BH CV ECHO MEAS - ESV(CUBED): 44.7 ML
BH CV ECHO MEAS - ESV(MOD-SP2): 40.3 ML
BH CV ECHO MEAS - ESV(MOD-SP4): 52.3 ML
BH CV ECHO MEAS - FS: 23.8 %
BH CV ECHO MEAS - IVS/LVPW: 0.97 CM
BH CV ECHO MEAS - IVSD: 1.11 CM
BH CV ECHO MEAS - LA DIMENSION: 3.3 CM
BH CV ECHO MEAS - LAT PEAK E' VEL: 7.1 CM/SEC
BH CV ECHO MEAS - LV DIASTOLIC VOL/BSA (35-75): 49.3 CM2
BH CV ECHO MEAS - LV MASS(C)D: 190.9 GRAMS
BH CV ECHO MEAS - LV MAX PG: 1.64 MMHG
BH CV ECHO MEAS - LV MEAN PG: 1 MMHG
BH CV ECHO MEAS - LV SYSTOLIC VOL/BSA (12-30): 26.9 CM2
BH CV ECHO MEAS - LV V1 MAX: 64 CM/SEC
BH CV ECHO MEAS - LV V1 VTI: 12.7 CM
BH CV ECHO MEAS - LVIDD: 4.7 CM
BH CV ECHO MEAS - LVIDS: 3.6 CM
BH CV ECHO MEAS - LVOT AREA: 3.1 CM2
BH CV ECHO MEAS - LVOT DIAM: 2 CM
BH CV ECHO MEAS - LVPWD: 1.14 CM
BH CV ECHO MEAS - MED PEAK E' VEL: 5.4 CM/SEC
BH CV ECHO MEAS - MV A MAX VEL: 71.1 CM/SEC
BH CV ECHO MEAS - MV DEC SLOPE: 284 CM/SEC2
BH CV ECHO MEAS - MV DEC TIME: 0.2 SEC
BH CV ECHO MEAS - MV E MAX VEL: 57 CM/SEC
BH CV ECHO MEAS - MV E/A: 0.8
BH CV ECHO MEAS - MV MAX PG: 2 MMHG
BH CV ECHO MEAS - MV MEAN PG: 1 MMHG
BH CV ECHO MEAS - MV P1/2T: 69.4 MSEC
BH CV ECHO MEAS - MV V2 VTI: 20.3 CM
BH CV ECHO MEAS - MVA(P1/2T): 3.2 CM2
BH CV ECHO MEAS - MVA(VTI): 1.97 CM2
BH CV ECHO MEAS - RAP SYSTOLE: 3 MMHG
BH CV ECHO MEAS - RVDD: 2.6 CM
BH CV ECHO MEAS - RVSP: 57.8 MMHG
BH CV ECHO MEAS - SV(LVOT): 39.9 ML
BH CV ECHO MEAS - SV(MOD-SP2): 34.6 ML
BH CV ECHO MEAS - SV(MOD-SP4): 43.8 ML
BH CV ECHO MEAS - SVI(LVOT): 20.5 ML/M2
BH CV ECHO MEAS - SVI(MOD-SP2): 17.8 ML/M2
BH CV ECHO MEAS - SVI(MOD-SP4): 22.5 ML/M2
BH CV ECHO MEAS - TR MAX PG: 54.8 MMHG
BH CV ECHO MEAS - TR MAX VEL: 370 CM/SEC
BH CV ECHO MEASUREMENTS AVERAGE E/E' RATIO: 9.12
BUN SERPL-MCNC: 34 MG/DL (ref 8–23)
BUN/CREAT SERPL: 24.5 (ref 7–25)
CALCIUM SPEC-SCNC: 8.1 MG/DL (ref 8.6–10.5)
CHLORIDE SERPL-SCNC: 101 MMOL/L (ref 98–107)
CO2 SERPL-SCNC: 25.2 MMOL/L (ref 22–29)
CREAT SERPL-MCNC: 1.39 MG/DL (ref 0.76–1.27)
DEPRECATED RDW RBC AUTO: 49 FL (ref 37–54)
EGFRCR SERPLBLD CKD-EPI 2021: 49.7 ML/MIN/1.73
EOSINOPHIL # BLD AUTO: 0 10*3/MM3 (ref 0–0.4)
EOSINOPHIL NFR BLD AUTO: 0 % (ref 0.3–6.2)
ERYTHROCYTE [DISTWIDTH] IN BLOOD BY AUTOMATED COUNT: 15.9 % (ref 12.3–15.4)
FERRITIN SERPL-MCNC: 88.71 NG/ML (ref 30–400)
FOLATE SERPL-MCNC: >20 NG/ML (ref 4.78–24.2)
GEN 5 2HR TROPONIN T REFLEX: 53 NG/L
GLUCOSE BLDC GLUCOMTR-MCNC: 117 MG/DL (ref 70–99)
GLUCOSE BLDC GLUCOMTR-MCNC: 174 MG/DL (ref 70–99)
GLUCOSE BLDC GLUCOMTR-MCNC: 94 MG/DL (ref 70–99)
GLUCOSE BLDC GLUCOMTR-MCNC: 96 MG/DL (ref 70–99)
GLUCOSE SERPL-MCNC: 92 MG/DL (ref 65–99)
HCT VFR BLD AUTO: 26.9 % (ref 37.5–51)
HGB BLD-MCNC: 8.4 G/DL (ref 13–17.7)
IMM GRANULOCYTES # BLD AUTO: 0.31 10*3/MM3 (ref 0–0.05)
IMM GRANULOCYTES NFR BLD AUTO: 7.6 % (ref 0–0.5)
IRON 24H UR-MRATE: 28 MCG/DL (ref 59–158)
IRON SATN MFR SERPL: 7 % (ref 20–50)
IVRT: 87 MS
L PNEUMO1 AG UR QL IA: NEGATIVE
LYMPHOCYTES # BLD AUTO: 0.57 10*3/MM3 (ref 0.7–3.1)
LYMPHOCYTES NFR BLD AUTO: 14 % (ref 19.6–45.3)
MACROCYTES BLD QL SMEAR: NORMAL
MAGNESIUM SERPL-MCNC: 1.7 MG/DL (ref 1.6–2.4)
MCH RBC QN AUTO: 26.4 PG (ref 26.6–33)
MCHC RBC AUTO-ENTMCNC: 31.2 G/DL (ref 31.5–35.7)
MCV RBC AUTO: 84.6 FL (ref 79–97)
MONOCYTES # BLD AUTO: 0.44 10*3/MM3 (ref 0.1–0.9)
MONOCYTES NFR BLD AUTO: 10.8 % (ref 5–12)
NEUTROPHILS NFR BLD AUTO: 2.71 10*3/MM3 (ref 1.7–7)
NEUTROPHILS NFR BLD AUTO: 66.9 % (ref 42.7–76)
NRBC BLD AUTO-RTO: 0 /100 WBC (ref 0–0.2)
NT-PROBNP SERPL-MCNC: 334.1 PG/ML (ref 0–1800)
OVALOCYTES BLD QL SMEAR: NORMAL
PLAT MORPH BLD: NORMAL
PLATELET # BLD AUTO: 174 10*3/MM3 (ref 140–450)
PMV BLD AUTO: 10 FL (ref 6–12)
POTASSIUM SERPL-SCNC: 4.3 MMOL/L (ref 3.5–5.2)
PROCALCITONIN SERPL-MCNC: 0.11 NG/ML (ref 0–0.25)
RBC # BLD AUTO: 3.18 10*6/MM3 (ref 4.14–5.8)
S PNEUM AG SPEC QL LA: NEGATIVE
SODIUM SERPL-SCNC: 136 MMOL/L (ref 136–145)
TIBC SERPL-MCNC: 395 MCG/DL (ref 298–536)
TRANSFERRIN SERPL-MCNC: 265 MG/DL (ref 200–360)
TROPONIN T DELTA: 6 NG/L
TROPONIN T SERPL HS-MCNC: 47 NG/L
TSH SERPL DL<=0.05 MIU/L-ACNC: 0.75 UIU/ML (ref 0.27–4.2)
VIT B12 BLD-MCNC: <150 PG/ML (ref 211–946)
WBC MORPH BLD: NORMAL
WBC NRBC COR # BLD AUTO: 4.06 10*3/MM3 (ref 3.4–10.8)

## 2024-06-20 PROCEDURE — 97165 OT EVAL LOW COMPLEX 30 MIN: CPT

## 2024-06-20 PROCEDURE — 25010000002 AZITHROMYCIN PER 500 MG: Performed by: INTERNAL MEDICINE

## 2024-06-20 PROCEDURE — G0378 HOSPITAL OBSERVATION PER HR: HCPCS

## 2024-06-20 PROCEDURE — 94640 AIRWAY INHALATION TREATMENT: CPT

## 2024-06-20 PROCEDURE — 93010 ELECTROCARDIOGRAM REPORT: CPT | Performed by: INTERNAL MEDICINE

## 2024-06-20 PROCEDURE — 25010000002 CYANOCOBALAMIN PER 1000 MCG: Performed by: INTERNAL MEDICINE

## 2024-06-20 PROCEDURE — 25010000002 NA FERRIC GLUC CPLX PER 12.5 MG: Performed by: INTERNAL MEDICINE

## 2024-06-20 PROCEDURE — 87205 SMEAR GRAM STAIN: CPT | Performed by: INTERNAL MEDICINE

## 2024-06-20 PROCEDURE — 71260 CT THORAX DX C+: CPT

## 2024-06-20 PROCEDURE — 25010000002 MAGNESIUM SULFATE 2 GM/50ML SOLUTION: Performed by: INTERNAL MEDICINE

## 2024-06-20 PROCEDURE — 87070 CULTURE OTHR SPECIMN AEROBIC: CPT | Performed by: INTERNAL MEDICINE

## 2024-06-20 PROCEDURE — 25510000001 IOPAMIDOL PER 1 ML: Performed by: INTERNAL MEDICINE

## 2024-06-20 PROCEDURE — 94799 UNLISTED PULMONARY SVC/PX: CPT

## 2024-06-20 PROCEDURE — 93306 TTE W/DOPPLER COMPLETE: CPT | Performed by: INTERNAL MEDICINE

## 2024-06-20 PROCEDURE — 87899 AGENT NOS ASSAY W/OPTIC: CPT | Performed by: INTERNAL MEDICINE

## 2024-06-20 PROCEDURE — 80048 BASIC METABOLIC PNL TOTAL CA: CPT | Performed by: STUDENT IN AN ORGANIZED HEALTH CARE EDUCATION/TRAINING PROGRAM

## 2024-06-20 PROCEDURE — 87449 NOS EACH ORGANISM AG IA: CPT | Performed by: INTERNAL MEDICINE

## 2024-06-20 PROCEDURE — 87040 BLOOD CULTURE FOR BACTERIA: CPT | Performed by: INTERNAL MEDICINE

## 2024-06-20 PROCEDURE — 94760 N-INVAS EAR/PLS OXIMETRY 1: CPT

## 2024-06-20 PROCEDURE — 82948 REAGENT STRIP/BLOOD GLUCOSE: CPT

## 2024-06-20 PROCEDURE — 84484 ASSAY OF TROPONIN QUANT: CPT | Performed by: STUDENT IN AN ORGANIZED HEALTH CARE EDUCATION/TRAINING PROGRAM

## 2024-06-20 PROCEDURE — 25810000003 SODIUM CHLORIDE 0.9 % SOLUTION: Performed by: INTERNAL MEDICINE

## 2024-06-20 PROCEDURE — 99222 1ST HOSP IP/OBS MODERATE 55: CPT | Performed by: STUDENT IN AN ORGANIZED HEALTH CARE EDUCATION/TRAINING PROGRAM

## 2024-06-20 PROCEDURE — 85007 BL SMEAR W/DIFF WBC COUNT: CPT | Performed by: STUDENT IN AN ORGANIZED HEALTH CARE EDUCATION/TRAINING PROGRAM

## 2024-06-20 PROCEDURE — 93005 ELECTROCARDIOGRAM TRACING: CPT | Performed by: INTERNAL MEDICINE

## 2024-06-20 PROCEDURE — 82948 REAGENT STRIP/BLOOD GLUCOSE: CPT | Performed by: STUDENT IN AN ORGANIZED HEALTH CARE EDUCATION/TRAINING PROGRAM

## 2024-06-20 PROCEDURE — 63710000001 PREDNISONE PER 1 MG: Performed by: INTERNAL MEDICINE

## 2024-06-20 PROCEDURE — 83735 ASSAY OF MAGNESIUM: CPT | Performed by: STUDENT IN AN ORGANIZED HEALTH CARE EDUCATION/TRAINING PROGRAM

## 2024-06-20 PROCEDURE — 85025 COMPLETE CBC W/AUTO DIFF WBC: CPT | Performed by: STUDENT IN AN ORGANIZED HEALTH CARE EDUCATION/TRAINING PROGRAM

## 2024-06-20 PROCEDURE — 93306 TTE W/DOPPLER COMPLETE: CPT

## 2024-06-20 PROCEDURE — 25010000002 CEFTRIAXONE PER 250 MG: Performed by: INTERNAL MEDICINE

## 2024-06-20 RX ORDER — PRAVASTATIN SODIUM 20 MG
40 TABLET ORAL NIGHTLY
Status: DISCONTINUED | OUTPATIENT
Start: 2024-06-20 | End: 2024-06-23 | Stop reason: HOSPADM

## 2024-06-20 RX ORDER — POLYETHYLENE GLYCOL 3350 17 G/17G
17 POWDER, FOR SOLUTION ORAL DAILY PRN
Status: DISCONTINUED | OUTPATIENT
Start: 2024-06-20 | End: 2024-06-23 | Stop reason: HOSPADM

## 2024-06-20 RX ORDER — PREDNISONE 20 MG/1
40 TABLET ORAL DAILY
Status: DISCONTINUED | OUTPATIENT
Start: 2024-06-20 | End: 2024-06-23 | Stop reason: HOSPADM

## 2024-06-20 RX ORDER — BISACODYL 5 MG/1
5 TABLET, DELAYED RELEASE ORAL DAILY PRN
Status: DISCONTINUED | OUTPATIENT
Start: 2024-06-20 | End: 2024-06-23 | Stop reason: HOSPADM

## 2024-06-20 RX ORDER — UREA 10 %
500 LOTION (ML) TOPICAL DAILY
Status: DISCONTINUED | OUTPATIENT
Start: 2024-06-21 | End: 2024-06-23 | Stop reason: HOSPADM

## 2024-06-20 RX ORDER — AMOXICILLIN 250 MG
2 CAPSULE ORAL 2 TIMES DAILY PRN
Status: DISCONTINUED | OUTPATIENT
Start: 2024-06-20 | End: 2024-06-23 | Stop reason: HOSPADM

## 2024-06-20 RX ORDER — NICOTINE POLACRILEX 4 MG
15 LOZENGE BUCCAL
Status: DISCONTINUED | OUTPATIENT
Start: 2024-06-20 | End: 2024-06-23 | Stop reason: HOSPADM

## 2024-06-20 RX ORDER — GABAPENTIN 300 MG/1
300 CAPSULE ORAL EVERY 8 HOURS SCHEDULED
Status: DISCONTINUED | OUTPATIENT
Start: 2024-06-20 | End: 2024-06-23 | Stop reason: HOSPADM

## 2024-06-20 RX ORDER — INSULIN LISPRO 100 [IU]/ML
2-7 INJECTION, SOLUTION INTRAVENOUS; SUBCUTANEOUS
Status: DISCONTINUED | OUTPATIENT
Start: 2024-06-20 | End: 2024-06-23 | Stop reason: HOSPADM

## 2024-06-20 RX ORDER — SOTALOL HYDROCHLORIDE 80 MG/1
40 TABLET ORAL EVERY 12 HOURS SCHEDULED
Status: DISCONTINUED | OUTPATIENT
Start: 2024-06-20 | End: 2024-06-23 | Stop reason: HOSPADM

## 2024-06-20 RX ORDER — CYANOCOBALAMIN 1000 UG/ML
1000 INJECTION, SOLUTION INTRAMUSCULAR; SUBCUTANEOUS
Status: DISCONTINUED | OUTPATIENT
Start: 2024-06-20 | End: 2024-06-23 | Stop reason: HOSPADM

## 2024-06-20 RX ORDER — ACETAMINOPHEN 325 MG/1
650 TABLET ORAL EVERY 4 HOURS PRN
Status: DISCONTINUED | OUTPATIENT
Start: 2024-06-20 | End: 2024-06-23 | Stop reason: HOSPADM

## 2024-06-20 RX ORDER — DEXTROSE MONOHYDRATE 25 G/50ML
25 INJECTION, SOLUTION INTRAVENOUS
Status: DISCONTINUED | OUTPATIENT
Start: 2024-06-20 | End: 2024-06-23 | Stop reason: HOSPADM

## 2024-06-20 RX ORDER — MAGNESIUM SULFATE HEPTAHYDRATE 40 MG/ML
2 INJECTION, SOLUTION INTRAVENOUS ONCE
Status: COMPLETED | OUTPATIENT
Start: 2024-06-20 | End: 2024-06-20

## 2024-06-20 RX ORDER — IPRATROPIUM BROMIDE AND ALBUTEROL SULFATE 2.5; .5 MG/3ML; MG/3ML
3 SOLUTION RESPIRATORY (INHALATION)
Status: DISCONTINUED | OUTPATIENT
Start: 2024-06-20 | End: 2024-06-23 | Stop reason: HOSPADM

## 2024-06-20 RX ORDER — BISACODYL 10 MG
10 SUPPOSITORY, RECTAL RECTAL DAILY PRN
Status: DISCONTINUED | OUTPATIENT
Start: 2024-06-20 | End: 2024-06-23 | Stop reason: HOSPADM

## 2024-06-20 RX ORDER — BUDESONIDE 0.5 MG/2ML
0.5 INHALANT ORAL
Status: DISCONTINUED | OUTPATIENT
Start: 2024-06-20 | End: 2024-06-23 | Stop reason: HOSPADM

## 2024-06-20 RX ORDER — ASPIRIN 81 MG/1
81 TABLET ORAL DAILY
Status: DISCONTINUED | OUTPATIENT
Start: 2024-06-20 | End: 2024-06-23 | Stop reason: HOSPADM

## 2024-06-20 RX ORDER — IBUPROFEN 600 MG/1
1 TABLET ORAL
Status: DISCONTINUED | OUTPATIENT
Start: 2024-06-20 | End: 2024-06-23 | Stop reason: HOSPADM

## 2024-06-20 RX ORDER — SODIUM CHLORIDE 0.9 % (FLUSH) 0.9 %
10 SYRINGE (ML) INJECTION AS NEEDED
Status: DISCONTINUED | OUTPATIENT
Start: 2024-06-20 | End: 2024-06-23 | Stop reason: HOSPADM

## 2024-06-20 RX ORDER — SODIUM CHLORIDE 9 MG/ML
40 INJECTION, SOLUTION INTRAVENOUS AS NEEDED
Status: DISCONTINUED | OUTPATIENT
Start: 2024-06-20 | End: 2024-06-23 | Stop reason: HOSPADM

## 2024-06-20 RX ORDER — ALBUTEROL SULFATE 2.5 MG/3ML
2.5 SOLUTION RESPIRATORY (INHALATION) EVERY 4 HOURS PRN
Status: DISCONTINUED | OUTPATIENT
Start: 2024-06-20 | End: 2024-06-23 | Stop reason: HOSPADM

## 2024-06-20 RX ORDER — ARFORMOTEROL TARTRATE 15 UG/2ML
15 SOLUTION RESPIRATORY (INHALATION)
Status: DISCONTINUED | OUTPATIENT
Start: 2024-06-20 | End: 2024-06-23 | Stop reason: HOSPADM

## 2024-06-20 RX ORDER — UREA 10 %
5 LOTION (ML) TOPICAL NIGHTLY PRN
Status: DISCONTINUED | OUTPATIENT
Start: 2024-06-20 | End: 2024-06-23 | Stop reason: HOSPADM

## 2024-06-20 RX ORDER — FERROUS SULFATE 325(65) MG
325 TABLET ORAL
Status: DISCONTINUED | OUTPATIENT
Start: 2024-06-21 | End: 2024-06-23 | Stop reason: HOSPADM

## 2024-06-20 RX ORDER — SODIUM CHLORIDE 0.9 % (FLUSH) 0.9 %
10 SYRINGE (ML) INJECTION EVERY 12 HOURS SCHEDULED
Status: DISCONTINUED | OUTPATIENT
Start: 2024-06-20 | End: 2024-06-23 | Stop reason: HOSPADM

## 2024-06-20 RX ORDER — ALUMINA, MAGNESIA, AND SIMETHICONE 2400; 2400; 240 MG/30ML; MG/30ML; MG/30ML
15 SUSPENSION ORAL EVERY 6 HOURS PRN
Status: DISCONTINUED | OUTPATIENT
Start: 2024-06-20 | End: 2024-06-23 | Stop reason: HOSPADM

## 2024-06-20 RX ADMIN — CEFTRIAXONE SODIUM 1000 MG: 1 INJECTION, POWDER, FOR SOLUTION INTRAMUSCULAR; INTRAVENOUS at 16:07

## 2024-06-20 RX ADMIN — Medication 10 ML: at 22:37

## 2024-06-20 RX ADMIN — SOTALOL HYDROCHLORIDE 40 MG: 80 TABLET ORAL at 10:25

## 2024-06-20 RX ADMIN — PREDNISONE 40 MG: 20 TABLET ORAL at 14:14

## 2024-06-20 RX ADMIN — ARFORMOTEROL TARTRATE 15 MCG: 15 SOLUTION RESPIRATORY (INHALATION) at 19:01

## 2024-06-20 RX ADMIN — GABAPENTIN 300 MG: 300 CAPSULE ORAL at 13:31

## 2024-06-20 RX ADMIN — GABAPENTIN 300 MG: 300 CAPSULE ORAL at 05:48

## 2024-06-20 RX ADMIN — IPRATROPIUM BROMIDE AND ALBUTEROL SULFATE 3 ML: .5; 3 SOLUTION RESPIRATORY (INHALATION) at 19:01

## 2024-06-20 RX ADMIN — SODIUM CHLORIDE 125 MG: 9 INJECTION, SOLUTION INTRAVENOUS at 13:31

## 2024-06-20 RX ADMIN — GABAPENTIN 300 MG: 300 CAPSULE ORAL at 22:36

## 2024-06-20 RX ADMIN — AZITHROMYCIN MONOHYDRATE 500 MG: 500 INJECTION, POWDER, LYOPHILIZED, FOR SOLUTION INTRAVENOUS at 14:45

## 2024-06-20 RX ADMIN — SENNOSIDES AND DOCUSATE SODIUM 2 TABLET: 50; 8.6 TABLET ORAL at 22:36

## 2024-06-20 RX ADMIN — CYANOCOBALAMIN 1000 MCG: 1000 INJECTION, SOLUTION INTRAMUSCULAR at 14:14

## 2024-06-20 RX ADMIN — Medication 10 ML: at 10:25

## 2024-06-20 RX ADMIN — SOTALOL HYDROCHLORIDE 40 MG: 80 TABLET ORAL at 22:36

## 2024-06-20 RX ADMIN — PRAVASTATIN SODIUM 40 MG: 20 TABLET ORAL at 22:35

## 2024-06-20 RX ADMIN — MAGNESIUM SULFATE HEPTAHYDRATE 2 G: 40 INJECTION, SOLUTION INTRAVENOUS at 10:25

## 2024-06-20 RX ADMIN — IOPAMIDOL 94 ML: 755 INJECTION, SOLUTION INTRAVENOUS at 09:38

## 2024-06-20 RX ADMIN — BUDESONIDE 0.5 MG: 0.5 INHALANT ORAL at 19:01

## 2024-06-20 RX ADMIN — ACETAMINOPHEN 650 MG: 325 TABLET ORAL at 05:49

## 2024-06-20 RX ADMIN — ASPIRIN 81 MG: 81 TABLET, COATED ORAL at 10:25

## 2024-06-20 NOTE — THERAPY EVALUATION
Patient Name: Arron Valencia  : 1940    MRN: [id number]                              Today's Date: 2024       Admit Date: 2024    Visit Dx:     ICD-10-CM ICD-9-CM   1. Weakness  R53.1 780.79   2. Dehydration  E86.0 276.51   3. Anemia, unspecified type  D64.9 285.9     Patient Active Problem List   Diagnosis    Generalized weakness     Past Medical History:   Diagnosis Date    COPD (chronic obstructive pulmonary disease)     Diabetes mellitus     GERD (gastroesophageal reflux disease)     Hyperlipidemia     Hypertension     Stroke      Past Surgical History:   Procedure Laterality Date    ABDOMINAL SURGERY      COLON SURGERY      COLONOSCOPY        General Information       Row Name 24 0910          OT Time and Intention    Document Type evaluation  -PG     Mode of Treatment individual therapy;occupational therapy  -PG       Row Name 2410          General Information    Patient Profile Reviewed yes  Patient has been living alone and independent with all ADLs.  Driving recently.  Uses a cane with functional mobility.  Progressive weakness over the last 2 to 3 weeks  -PG     Prior Level of Function independent:;transfer;ADL's  -PG     Existing Precautions/Restrictions fall  -PG     Barriers to Rehab none identified  -PG       Row Name 2410          Occupational Profile    Reason for Services/Referral (Occupational Profile) Patient is a 84-year-old male admitted for dehydration, weakness and anemia.  OT evaluation completed due to recent decline in ADL function.  No previous  OT services identified.  -PG       Row Name 24 0910          Living Environment    People in Home alone  -PG       Row Name 2410          Cognition    Orientation Status (Cognition) oriented to;person;place;verbal cues/prompts needed for orientation  -PG       Row Name 24 0910          Safety Issues, Functional Mobility    Impairments Affecting Function (Mobility)  balance;cognition;endurance/activity tolerance;strength  -PG     Cognitive Impairments, Mobility Safety/Performance judgment;sequencing abilities;problem-solving/reasoning;awareness, need for assistance  -PG               User Key  (r) = Recorded By, (t) = Taken By, (c) = Cosigned By      Initials Name Provider Type    PG Juventino Gallardo OT Occupational Therapist                     Mobility/ADL's       Row Name 06/20/24 0912          Bed Mobility    Bed Mobility supine-sit  -     Supine-Sit [place] (Bed Mobility) maximum assist (25% patient effort)  -PG       Row Name 06/20/24 0912          Transfers    Transfers bed-chair transfer  -       Row Name 06/20/24 0912          Bed-Chair Transfer    Bed-Chair [place] (Transfers) minimum assist (75% patient effort);verbal cues  -     Assistive Device (Bed-Chair Transfers) walker, front-wheeled  -       Row Name 06/20/24 0912          Activities of Daily Living    BADL Assessment/Intervention bathing;upper body dressing;lower body dressing;grooming;toileting  -       Row Name 06/20/24 0912          Bathing Assessment/Intervention    [place] Level (Bathing) bathing skills;maximum assist (25% patient effort)  -       Row Name 06/20/24 0912          Upper Body Dressing Assessment/Training    [place] Level (Upper Body Dressing) upper body dressing skills;maximum assist (25% patient effort)  -       Row Name 06/20/24 0912          Lower Body Dressing Assessment/Training    [place] Level (Lower Body Dressing) lower body dressing skills;dependent (less than 25% patient effort)  -       Row Name 06/20/24 0912          Grooming Assessment/Training    [place] Level (Grooming) grooming skills;maximum assist (25% patient effort)  -       Row Name 06/20/24 0912          Toileting Assessment/Training    [place] Level (Toileting) toileting skills;dependent (less than 25% patient effort)  -               User Key  (r) = Recorded By,  (t) = Taken By, (c) = Cosigned By      Initials Name Provider Type    PG Juventino Gallardo OT Occupational Therapist                   Obj/Interventions       Row Name 06/20/24 0913          Sensory Assessment (Somatosensory)    Sensory Assessment (Somatosensory) not tested  -PG       [facility] Name 06/20/24 0913          Vision Assessment/Intervention    Visual Impairment/Limitations unable/difficult to assess  -PG       Row Name 06/20/24 0913          Range of Motion Comprehensive    General Range of Motion no range of motion deficits identified  -PG       [facility] Name 06/20/24 0913          Strength Comprehensive (MMT)    Comment, General Manual Muscle Testing (MMT) Assessment 4 -/5 BUE  -PG       [facility] Name 06/20/24 0913          Motor Skills    Motor Skills functional endurance;coordination  -PG     Coordination WFL  -PG     Functional Endurance Poor  -PG               User Key  (r) = Recorded By, (t) = Taken By, (c) = Cosigned By      Initials Name Provider Type    PG Juventino Gallardo OT Occupational Therapist                   Goals/Plan       Row Name 06/20/24 0916          Transfer Goal 1 (OT)    Activity/Assistive Device (Transfer Goal 1, OT) transfers, all  -PG     [place] Level/Cues Needed (Transfer Goal 1, OT) modified independence  -PG     Time Frame (Transfer Goal 1, OT) long term goal (LTG);10 days  -PG       [facility] Name 06/20/24 0916          Bathing Goal 1 (OT)    Activity/Device (Bathing Goal 1, OT) bathing skills, all  -PG     [place] Level/Cues Needed (Bathing Goal 1, OT) modified independence  -PG     Time Frame (Bathing Goal 1, OT) long term goal (LTG);10 days  -PG       [facility] Name 06/20/24 0916          Dressing Goal 1 (OT)    Activity/Device (Dressing Goal 1, OT) dressing skills, all  -PG     [place]/Cues Needed (Dressing Goal 1, OT) modified independence  -PG     Time Frame (Dressing Goal 1, OT) long term goal (LTG);10 days  -PG       [facility] Name 06/20/24 0916          Toileting Goal 1 (OT)     Activity/Device (Toileting Goal 1, OT) toileting skills, all  -PG     [place] Level/Cues Needed (Toileting Goal 1, OT) modified independence  -PG     Time Frame (Toileting Goal 1, OT) long term goal (LTG);10 days  -PG       Row Name 06/20/24 0916          Grooming Goal 1 (OT)    Activity/Device (Grooming Goal 1, OT) grooming skills, all  -PG     [place] (Grooming Goal 1, OT) modified independence  -PG     Time Frame (Grooming Goal 1, OT) long term goal (LTG);10 days  -PG       Row Name 06/20/24 0916          Strength Goal 1 (OT)    Strength Goal 1 (OT) Patient will improve bilateral upper extremity strength to 4+/5 to support independence with self-care activity and functional transfers  -PG     Time Frame (Strength Goal 1, OT) long term goal (LTG);10 days  -PG       Row Name 06/20/24 0916          Problem Specific Goal 1 (OT)    Problem Specific Goal 1 (OT) Patient will improve activity tolerance to fair plus to support independence with self-care activities  -PG     Time Frame (Problem Specific Goal 1, OT) long term goal (LTG);10 days  -PG       Row Name 06/20/24 0916          Therapy Assessment/Plan (OT)    Planned Therapy Interventions (OT) activity tolerance training;BADL retraining;strengthening exercise;transfer/mobility retraining;patient/caregiver education/training;occupation/activity based interventions  -PG               User Key  (r) = Recorded By, (t) = Taken By, (c) = Cosigned By      Initials Name Provider Type    PG Juventino Gallardo, OT Occupational Therapist                   Clinical Impression       Row Name 06/20/24 0914          Pain Assessment    Pretreatment Pain Rating 0/10 - no pain  -PG     Posttreatment Pain Rating 0/10 - no pain  -PG       Row Name 06/20/24 0914          Plan of Care Review    Plan of Care Reviewed With patient  -PG     Progress no change  -PG     Outcome Evaluation Patient presents with limitations affecting strength, activity tolerance, and balance impacting  patient's ability to return home safely and independently.  The skills of a therapist will be required to safely and effectively implement the following treatment plan to restore maximal level of function  -PG       Row Name 06/20/24 0914          Therapy Assessment/Plan (OT)    Patient/Family Therapy Goal Statement (OT) Get stronger and return home independently  -PG     Rehab Potential (OT) good, to achieve stated therapy goals  -PG     Criteria for Skilled Therapeutic Interventions Met (OT) meets criteria;yes;skilled treatment is necessary  -PG     Therapy Frequency (OT) 5 times/wk  -PG       Row Name 06/20/24 0914          Therapy Plan Review/Discharge Plan (OT)    Anticipated Discharge Disposition (OT) skilled nursing facility;inpatient rehabilitation facility;sub acute care setting  -PG               User Key  (r) = Recorded By, (t) = Taken By, (c) = Cosigned By      Initials Name Provider Type    PG Juventino Gallardo, OT Occupational Therapist                   Outcome Measures       Row Name 06/20/24 0917          How much help from another is currently needed...    Putting on and taking off regular lower body clothing? 1  -PG     Bathing (including washing, rinsing, and drying) 1  -PG     Toileting (which includes using toilet bed pan or urinal) 1  -PG     Putting on and taking off regular upper body clothing 2  -PG     Taking care of personal grooming (such as brushing teeth) 3  -PG     Eating meals 3  -PG     AM-PAC 6 Clicks Score (OT) 11  -PG       Row Name 06/20/24 0329          How much help from another person do you currently need...    Turning from your back to your side while in flat bed without using bedrails? 4  -DM     Moving from lying on back to sitting on the side of a flat bed without bedrails? 3  -DM     Moving to and from a bed to a chair (including a wheelchair)? 3  -DM     Standing up from a chair using your arms (e.g., wheelchair, bedside chair)? 3  -DM     Climbing 3-5 steps with a  railing? 2  -DM     To walk in hospital room? 2  -DM     AM-PAC 6 Clicks Score (PT) 17  -DM     Highest Level of Mobility Goal 5 --> Static standing  -DM       Row Name 06/20/24 0917          Functional Assessment    Outcome Measure Options AM-PAC 6 Clicks Daily Activity (OT);Optimal Instrument  -PG       Row Name 06/20/24 0917          Optimal Instrument    Optimal Instrument Optimal - 3  -PG     Bending/Stooping 4  -PG     Standing 3  -PG     Reaching 2  -PG     From the list, choose the 3 activities you would most like to be able to do without any difficulty Bending/stooping;Standing;Reaching  -PG     Total Score Optimal - 3 9  -PG               User Key  (r) = Recorded By, (t) = Taken By, (c) = Cosigned By      Initials Name Provider Type    PG Juventino Gallardo, VENKATA Occupational Therapist    Erin Lea LPN Licensed Nurse                    Occupational Therapy Education       Title: PT OT SLP Therapies (Done)       Topic: Occupational Therapy (Done)       Point: ADL training (Done)       Description:   Instruct learner(s) on proper safety adaptation and remediation techniques during self care or transfers.   Instruct in proper use of assistive devices.                  Learning Progress Summary             Patient Acceptance, E,D, DU by PG at 6/20/2024 0918                         Point: Home exercise program (Done)       Description:   Instruct learner(s) on appropriate technique for monitoring, assisting and/or progressing therapeutic exercises/activities.                  Learning Progress Summary             Patient Acceptance, E,D, DU by PG at 6/20/2024 0918                         Point: Precautions (Done)       Description:   Instruct learner(s) on prescribed precautions during self-care and functional transfers.                  Learning Progress Summary             Patient Acceptance, E,D, DU by PG at 6/20/2024 0918                         Point: Body mechanics (Done)       Description:    Instruct learner(s) on proper positioning and spine alignment during self-care, functional mobility activities and/or exercises.                  Learning Progress Summary             Patient Acceptance, E,D, DU by PG at 6/20/2024 0918                                         User Key       Initials Effective Dates Name Provider Type Discipline    PG 06/16/21 -  Juventino Gallardo OT Occupational Therapist OT                  OT Recommendation and Plan  Planned Therapy Interventions (OT): activity tolerance training, BADL retraining, strengthening exercise, transfer/mobility retraining, patient/caregiver education/training, occupation/activity based interventions  Therapy Frequency (OT): 5 times/wk  Plan of Care Review  Plan of Care Reviewed With: patient  Progress: no change  Outcome Evaluation: Patient presents with limitations affecting strength, activity tolerance, and balance impacting patient's ability to return home safely and independently.  The skills of a therapist will be required to safely and effectively implement the following treatment plan to restore maximal level of function     Time Calculation:   Evaluation Complexity (OT)  Review Occupational Profile/Medical/Therapy History Complexity: brief/low complexity  Assessment, Occupational Performance/Identification of Deficit Complexity: 3-5 performance deficits  Clinical Decision Making Complexity (OT): problem focused assessment/low complexity  Overall Complexity of Evaluation (OT): low complexity     Time Calculation- OT       Row Name 06/20/24 0919             Time Calculation- OT    OT Received On 06/20/24  -PG      OT Goal Re-Cert Due Date 06/29/24  -PG         Untimed Charges    OT Eval/Re-eval Minutes 35  -PG         Total Minutes    Untimed Charges Total Minutes 35  -PG       Total Minutes 35  -PG                User Key  (r) = Recorded By, (t) = Taken By, (c) = Cosigned By      Initials Name Provider Type    PG Juventino Gallardo OT Occupational  Therapist                  Therapy Charges for Today       Code Description Service Date Service Provider Modifiers Qty    76694456526 HC OT EVAL LOW COMPLEXITY 3 6/20/2024 Juventino Gallardo, OT GO 1                 Juventino Gallardo OT  6/20/2024

## 2024-06-20 NOTE — H&P
[facility]   HOSPITALIST HISTORY AND PHYSICAL  Date: 2024   Patient Name: Arron Valencia  : 1940  MRN: [id number]  Primary Care Physician:  Shanti Gutierres APRN  Date of admission: 2024    Subjective   Subjective     Chief Complaint: Weakness    HPI:    Arron Valencia is a 84 y.o. male past ministry of [facility] on sotalol, type 2 diabetes, bilateral subdural hematomas in , chronic pain, hyperlipidemia, GERD who presents to the ER due to worsening weakness.  Patient is relatively poor historian and history supplemented by discussion with daughter who is at bedside.  Appears patient has been having progressive exertional dyspnea over the last few weeks and was evaluated in the outpatient setting 6 weeks ago for similar complaints.  He has had progressive decline and worsening weakness particular over the last day which is limited his ability to get out of bed.  In that time he is also had poor p.o. intake and was told at some point that he had a B12 deficiency but he never was started on treatment according to his daughter at the bedside.  Patient was unable to get out of bed today and his daughter felt it was best to have him finally evaluated in the ER today.  In addition she noted him to be saturating 84% on room air and wanted him evaluated for that as well.    Upon arrival patient was found to be hemodynamically stable but upon ambulation had a drop in saturation to 88% at which point he was placed on 2 L nasal cannula with improvement.  Lab workup was revealing of a hemoglobin of 8.3 down from the last level we have available of 13.4 over 4 years ago.  CMP shows a stable creatinine 1.53 and troponin is mildly elevated at 46.  EKG was personally reviewed and showed sinus rhythm with left anterior hemiblock as well as poor progression anteriorly and a borderline QT prolongation.  Patient was treated with a liter fluid bolus in the ER and the hospitalist then contacted for further  evaluation.  At the time exam patient is resting comfortably but does state he has had some notable exertional dyspnea at home.  In addition he complains of some nocturnal dyspnea and orthopnea.  Denies any history of CHF.  Chest x-ray was unremarkable for any pulmonary edema.  Patient denies any lower extremity edema.  Patient denies any recent medication changes.  No chest pain.      Personal History     Past Medical History:  History reviewed. No pertinent past medical history.      Past Surgical History:  History reviewed. No pertinent surgical history.      Family History:   Reviewed and noncontributory except as mentioned in HPI    Social History:   Social Determinants of Health     Tobacco Use: High Risk (5/13/2024)    Received from [employer]    Patient History     Smoking Tobacco Use: Former     Smokeless Tobacco Use: Current     Passive Exposure: Not on file   Alcohol Use: Not At Risk (10/14/2022)    Received from [employer]    AUDIT-C     Frequency of Alcohol Consumption: Never     Average Number of Drinks: Patient does not drink     Frequency of Binge Drinking: Never   Financial Resource Strain: Low Risk  (10/14/2022)    Received from [employer]    Overall Financial Resource Strain (CARDIA)     Difficulty of Paying Living Expenses: Not hard at all   Food Insecurity: No Food Insecurity (10/14/2022)    Received from [employer]    Hunger Vital Sign     Worried About Running Out of Food in the Last Year: Never true     Ran Out of Food in the Last Year: Never true   Transportation Needs: No Transportation Needs (10/14/2022)    Received from [employer]    PRAPARE - Transportation     Lack of Transportation (Medical): No     Lack of Transportation (Non-Medical): No   Physical Activity: Inactive (10/14/2022)    Received from [employer]    Exercise Vital Sign     Days of Exercise per Week: 0 days     Minutes of Exercise per Session: 0 min   Stress: No Stress Concern Present  (10/14/2022)    Received from [facility] Occupational [facility] - Occupational Stress Questionnaire     Feeling of Stress : Not at all   Social Connections: Unknown (10/12/2023)    Family and Community Support     Help with Day-to-Day Activities: Not on file     Lonely or Isolated: Not on file   Interpersonal Safety: Not At Risk (6/19/2024)    Abuse Screen     Unsafe at Home or Work/School: no     Feels Threatened by Someone?: no     Does Anyone Keep You from Contacting Others or Doint Things Outside the Home?: no     Physical Sign of Abuse Present: no   Depression: Not on file   Housing Stability: Not At Risk (12/29/2023)    Received from [facility] Housing Stability Vital Sign     What is your living situation today?: Not on file     Think about the place you live. Do you have problems with any of the following?: Not on file   Utilities: Not on file   Health Literacy: Unknown (10/12/2023)    Education     Help with school or training?: Not on file     Preferred Language: Not on file   Employment: Unknown (10/12/2023)    Employment     Do you want help finding or keeping work or a job?: Not on file   Disabilities: Not At Risk (2/21/2024)    Received from [facility]    Safety and Environment     How often does anyone, including family and friends, physically hurt you?: Not on file     How often does anyone, including family and friends, insult or talk down to you?: Not on file     How often does anyone, including family and friends, threaten you with harm?: Not on file     How often does anyone, including family and friends, scream or curse at you?': Not on file         Home Medications:  Budeson-Glycopyrrol-Formoterol, HYDROcodone-acetaminophen, aspirin, gabapentin, metFORMIN, omeprazole, pravastatin, and sotalol    Allergies:  No Known Allergies    Review of Systems   All systems were reviewed and negative except for: Shortness of breath, weakness    Objective    Objective     Vitals:   Temp:  [98.9 °F (37.2 °C)] 98.9 °F (37.2 °C)  Heart Rate:  [79-86] 83  Resp:  [20] 20  BP: (115-118)/(55-58) 115/58  Flow (L/min):  [2] 2    Physical Exam    Constitutional: Awake, alert, no acute distress   Eyes: Pupils equal, sclerae anicteric, no conjunctival injection   HENT: NCAT, mucous membranes moist   Neck: Supple, no thyromegaly, no lymphadenopathy, trachea midline   Respiratory: Clear to auscultation bilaterally, nonlabored respirations    Cardiovascular: RRR, no murmurs, rubs, or gallops, palpable pedal pulses bilaterally   Gastrointestinal: Positive bowel sounds, soft, nontender, nondistended   Musculoskeletal: No bilateral ankle edema, no clubbing or cyanosis to extremities   Psychiatric: Appropriate affect, cooperative   Neurologic: Oriented x 3, strength symmetric in all extremities, Cranial Nerves grossly intact to confrontation, speech clear   Skin: No rashes     Result Review    Result Review:  I have personally reviewed the results from the time of this admission to 6/20/2024 01:10 EDT and agree with these findings:  [x]  Laboratory  [x]  Microbiology  [x]  Radiology  [x]  EKG/Telemetry   [x]  Cardiology/Vascular   []  Pathology  [x]  Old records  []  Other:      Assessment & Plan   Assessment / Plan     Assessment/Plan:   Generalized weakness  Acute on chronic normocytic anemia  Possible hypoxia  CKD stage IIIb  Elevated troponin likely secondary to delayed renal clearance  Paroxysmal atrial fibrillation not on anticoagulation due to unclear reason, possibly secondary to history of subdural hematoma  Hypertension  Hyperlipidemia  Type 2 diabetes  History of bilateral subdural hematomas in 2015    Plan  - Admit to hospitalist service  - Weakness potentially related to his anemia that seems to be worsening.  Unclear why he was not started on B12 treatment if he was told he was deficient.  We will check B12 levels, folate and iron studies as well to evaluate anemia  further.  FOBT is pending to rule out an acute bleed although patient denies have any melena hematochezia  - Hypoxia of unclear etiology as he lacks any white count or clear symptoms of pneumonia.  Check procalcitonin and proBNP level.  If proBNP remains elevated may need to evaluate for heart failure etiology or valvulopathy  - Continue to trend renal function daily and hemoglobin daily as well.  Transfuse.  For hemoglobin less than 7  - Continue home sotalol for paroxysmal A-fib  - Repeat additional troponin.  EKG personally reviewed did not reveal any acute ST elevations or depressions  - Insulin sliding scale and Accu-Cheks for type 2 diabetes  -PT/OT eval  - Clarify other chronic, to resume as appropriate      Discussed with ER Physician and Nurse    All labs/imaging studies were personally reviewed and findings are as noted above      DVT Prophylaxis: SCDs    CODE STATUS:    Code Status (Patient has no pulse and is not breathing): CPR (Attempt to Resuscitate)  Medical Interventions (Patient has pulse or is breathing): Full Support      Admission Status:  I believe this patient meets observation status.    Electronically signed by Ross Cuello MD, 06/20/24, 1:10 AM EDT.

## 2024-06-20 NOTE — ED PROVIDER NOTES
Time: 9:55 PM EDT  Date of encounter:  6/19/2024  Independent Historian/Clinical History and Information was obtained by:   Patient, Family, and EMS    History is limited by: N/A    Chief Complaint: Weakness, unable to get out of bed.      History of Present Illness:  Patient is a 84 y.o. year old male who presents to the emergency department for evaluation of weakness and unable to get out of bed.  This patient was brought to the emergency department today after his family noted that he has been extremely weak and unable to get out of bed.  The patient said no fever chills or cough or vomiting or diarrhea and he denies any bloody or black bowel movements.  The patient has not been eating or drinking well and has been able to get out of bed to eat or drink over the last couple of days according to the family.  The patient does live by himself.  The patient has a history of diabetes and atrial fibrillation.    HPI    Patient Care Team  Primary Care Provider: Shanti Gutierres APRN    Past Medical History:     No Known Allergies  History reviewed. No pertinent past medical history.  History reviewed. No pertinent surgical history.  History reviewed. No pertinent family history.    Home Medications:  Prior to Admission medications    Not on File        Social History:          Review of Systems:  Review of Systems   Constitutional:  Positive for activity change, appetite change and fatigue. Negative for chills and fever.   HENT:  Negative for congestion, ear pain and sore throat.    Eyes:  Negative for pain.   Respiratory:  Negative for cough, chest tightness and shortness of breath.    Cardiovascular:  Negative for chest pain.   Gastrointestinal:  Negative for abdominal pain, diarrhea, nausea and vomiting.   Genitourinary:  Negative for flank pain and hematuria.   Musculoskeletal:  Negative for joint swelling.   Skin:  Negative for pallor.   Neurological:  Positive for weakness. Negative for seizures and headaches.  "  All other systems reviewed and are negative.       Physical Exam:  /58   Pulse 83   Temp 98.9 °F (37.2 °C) (Oral)   Resp 20   Ht 190.5 cm (75\")   Wt 70.5 kg (155 lb 6.8 oz)   SpO2 94%   BMI 19.43 kg/m²     Physical Exam  Vitals and nursing note reviewed.   Constitutional:       General: He is not in acute distress.     Appearance: He is not toxic-appearing.   HENT:      Head: Normocephalic and atraumatic.      Nose: Nose normal.      Mouth/Throat:      Mouth: Mucous membranes are dry.   Eyes:      General: No scleral icterus.     Extraocular Movements: Extraocular movements intact.      Pupils: Pupils are equal, round, and reactive to light.   Cardiovascular:      Rate and Rhythm: Normal rate and regular rhythm.      Pulses: Normal pulses.      Heart sounds: Normal heart sounds.   Pulmonary:      Effort: Pulmonary effort is normal. No respiratory distress.      Breath sounds: Normal breath sounds.   Abdominal:      General: Abdomen is flat.      Palpations: Abdomen is soft.      Tenderness: There is no abdominal tenderness.   Musculoskeletal:         General: Normal range of motion.      Cervical back: Normal range of motion and neck supple.   Skin:     General: Skin is warm and dry.      Capillary Refill: Capillary refill takes less than 2 seconds.   Neurological:      General: No focal deficit present.      Mental Status: He is alert and oriented to person, place, and time. Mental status is at baseline.   Psychiatric:         Mood and Affect: Mood normal.         Behavior: Behavior normal.         Thought Content: Thought content normal.         Judgment: Judgment normal.                  Procedures:  Procedures      Medical Decision Making:      Comorbidities that affect care:    Atrial Fibrillation, Diabetes    External Notes reviewed:    Previous Clinic Note: Primary care visit for atrial fibrillation and diabetes.      The following orders were placed and all results were independently analyzed " by me:  Orders Placed This Encounter   Procedures    Urine Culture - Urine,    XR Chest 1 View    [place] Draw    Comprehensive Metabolic Panel    Single High Sensitivity Troponin T    Magnesium    CBC Auto Differential    Manual Differential    Urinalysis With Culture If Indicated - Urine, Clean Catch    Occult Blood, Fecal By Immunoassay - Stool, Per Rectum    Urinalysis, Microscopic Only - Urine, Clean Catch    Procalcitonin    NPO Diet NPO Type: Strict NPO    Undress & Gown    Continuous Pulse Oximetry    Vital Signs    Orthostatic Blood Pressure    Code Status and Medical Interventions:    Hospitalist (on-call MD unless specified)    Oxygen Therapy- Nasal Cannula; Titrate 1-6 LPM Per SpO2; 90 - 95%    POC Glucose Once    POC Glucose 4x Daily Before Meals & at Bedtime    ECG 12 Lead ED Triage Standing Order; Weak / Dizzy / AMS    Insert Peripheral IV    Initiate Observation Status    Fall Precautions    CBC & Differential    Green Top (Gel)    Lavender Top    Gold Top - SST    Light Blue Top       Medications Given in the Emergency Department:  Medications   sodium chloride 0.9 % flush 10 mL (has no administration in time range)   dextrose (GLUTOSE) oral gel 15 g (has no administration in time range)   dextrose (D50W) (25 g/50 mL) IV injection 25 g (has no administration in time range)   glucagon (GLUCAGEN) injection 1 mg (has no administration in time range)   Insulin Lispro (humaLOG) injection 2-7 Units (has no administration in time range)   sodium chloride 0.9 % bolus 1,000 mL (1,000 mL Intravenous New Bag 6/19/24 2234)        ED Course:       EKG: Sinus rhythm rate 81 beats per  Normal P wave.  Normal  Left axis deviation  Acute ischemic changes.    Labs:    Lab Results (last 24 hours)       Procedure Component Value Units Date/Time    CBC & Differential [[id number]]  (Abnormal) Collected: 06/19/24 1856    Specimen: Blood Updated: 06/19/24 1935    Narrative:      The following orders were created for panel  order CBC & Differential.  Procedure                               Abnormality         Status                     ---------                               -----------         ------                     CBC Auto Differential[[id number]]        Abnormal            Final result               Scan Slide[[id number]]                                                                    Please view results for these tests on the individual orders.    Comprehensive Metabolic Panel [[id number]]  (Abnormal) Collected: 06/19/24 1856    Specimen: Blood Updated: 06/19/24 1928     Glucose 102 mg/dL      BUN 39 mg/dL      Creatinine 1.53 mg/dL      Sodium 134 mmol/L      Potassium 4.4 mmol/L      Chloride 98 mmol/L      CO2 24.6 mmol/L      Calcium 8.2 mg/dL      Total Protein 5.9 g/dL      Albumin 3.6 g/dL      ALT (SGPT) 14 U/L      AST (SGOT) 29 U/L      Alkaline Phosphatase 76 U/L      Total Bilirubin 0.2 mg/dL      Globulin 2.3 gm/dL      A/G Ratio 1.6 g/dL      BUN/Creatinine Ratio 25.5     Anion Gap 11.4 mmol/L      eGFR 44.3 mL/min/1.73     Narrative:      GFR Normal >60  Chronic Kidney Disease <60  Kidney Failure <15    The GFR formula is only valid for adults with stable renal function between ages 18 and 70.    Single High Sensitivity Troponin T [[id number]]  (Abnormal) Collected: 06/19/24 1856    Specimen: Blood Updated: 06/19/24 1928     HS Troponin T 46 ng/L     Narrative:      High Sensitive Troponin T Reference Range:  <14.0 ng/L- Negative Female for AMI  <22.0 ng/L- Negative Male for AMI  >=14 - Abnormal Female indicating possible myocardial injury.  >=22 - Abnormal Male indicating possible myocardial injury.   Clinicians would have to utilize clinical acumen, EKG, Troponin, and serial changes to determine if it is an Acute Myocardial Infarction or myocardial injury due to an underlying chronic condition.         Magnesium [[id number]]  (Normal) Collected: 06/19/24 1856    Specimen: Blood Updated: 06/19/24 1928      "Magnesium 2.0 mg/dL     CBC Auto Differential [[id number]]  (Abnormal) Collected: 06/19/24 1856    Specimen: Blood Updated: 06/19/24 1935     WBC 4.62 10*3/mm3      RBC 3.10 10*6/mm3      Hemoglobin 8.3 g/dL      Hematocrit 26.2 %      MCV 84.5 fL      MCH 26.8 pg      MCHC 31.7 g/dL      RDW 16.1 %      RDW-SD 49.8 fl      MPV 9.7 fL      Platelets 185 10*3/mm3     Manual Differential [[id number]]  (Abnormal) Collected: 06/19/24 1856    Specimen: Blood Updated: 06/19/24 1935     Neutrophil % 74.0 %      Lymphocyte % 9.0 %      Monocyte % 13.0 %      Myelocyte % 4.0 %      Neutrophils Absolute 3.42 10*3/mm3      Lymphocytes Absolute 0.42 10*3/mm3      Monocytes Absolute 0.60 10*3/mm3      RBC Morphology Normal     WBC Morphology Normal     Platelet Estimate Adequate    Procalcitonin [[id number]]  (Normal) Collected: 06/19/24 1856    Specimen: Blood Updated: 06/20/24 0149     Procalcitonin 0.11 ng/mL     Narrative:      As a Marker for Sepsis (Non-Neonates):    1. <0.5 ng/mL represents a low risk of severe sepsis and/or septic shock.  2. >2 ng/mL represents a high risk of severe sepsis and/or septic shock.    As a Marker for Lower Respiratory Tract Infections that require antibiotic therapy:    PCT on Admission    Antibiotic Therapy       6-12 Hrs later    >0.5                Strongly Recommended  >0.25 - <0.5        Recommended  0.1 - 0.25          Discouraged              Remeasure/reassess PCT  <0.1                Strongly Discouraged     Remeasure/reassess PCT    As 28 day mortality risk marker: \"Change in Procalcitonin Result\" (>80% or <=80%) if Day 0 (or Day 1) and Day 4 values are available. Refer to http://www.[facility]-pct-calculator.com    Change in PCT <=80%  A decrease of PCT levels below or equal to 80% defines a positive change in PCT test result representing a higher risk for 28-day all-cause mortality of patients diagnosed with severe sepsis for septic shock.    Change in PCT >80%  A decrease of PCT levels " of more than 80% defines a negative change in PCT result representing a lower risk for 28-day all-cause mortality of patients diagnosed with severe sepsis or septic shock.    This test is Prognostic not Diagnostic, if elevated correlate with clinical findings before administering antibiotic treatment.        Urinalysis With Culture If Indicated - Urine, Clean Catch [[id number]]  (Abnormal) Collected: 06/19/24 2219    Specimen: Urine, Clean Catch Updated: 06/19/24 2231     Color, UA Yellow     Appearance, UA Clear     pH, UA 5.5     Specific Gravity, UA 1.016     Glucose, UA Negative     Ketones, UA Negative     Bilirubin, UA Negative     Blood, UA Trace     Protein,  mg/dL (2+)     Leuk Esterase, UA Negative     Nitrite, UA Negative     Urobilinogen, UA 0.2 E.U./dL    Narrative:      In absence of clinical symptoms, the presence of pyuria, bacteria, and/or nitrites on the urinalysis result does not correlate with infection.    Urinalysis, Microscopic Only - Urine, Clean Catch [[id number]] Collected: 06/19/24 2219    Specimen: Urine, Clean Catch Updated: 06/19/24 2235     RBC, UA 0-2 /HPF      WBC, UA 0-2 /HPF      Bacteria, UA None Seen /HPF      Squamous Epithelial Cells, UA 0-2 /HPF      Hyaline Casts, UA None Seen /LPF      Methodology Automated Microscopy    Urine Culture - Urine, Urine, Clean Catch [[id number]] Collected: 06/19/24 2219    Specimen: Urine, Clean Catch Updated: 06/19/24 2228             Imaging:    XR Chest 1 View    Result Date: 6/19/2024  XR CHEST 1 VW Date of Exam: 6/19/2024 7:07 PM EDT Indication: Weak/Dizzy/AMS triage protocol Comparison: None available. Findings: Heart size and pulmonary vessels are within normal limits. There is emphysematous change of the lungs. Lungs are clear. No pleural effusion. No pneumothorax identified. Bony structures are unremarkable.     Impression: 1. No acute cardiopulmonary disease. Electronically Signed: Laith Marina MD  6/19/2024 7:26 PM EDT   Workstation ID: [id number]       Differential Diagnosis and Discussion:    Weakness: Based on the patient's history, signs, and symptoms, the diffential diagnosis includes but is not limited to meningitis, stroke, sepsis, subarachnoid hemorrhage, intracranial bleeding, encephalitis, acute uti, dehydration, MS, myasthenia gravis, Gu[place], migraine variant, neuromuscular disorders vertigo, electrolyte imbalance, and metabolic disorders.    All labs were reviewed and interpreted by me.  EKG was interpreted by me.    MDM     Amount and/or Complexity of Data Reviewed  Clinical lab tests: reviewed  Tests in the radiology section of CPT®: reviewed  Tests in the medicine section of CPT®: reviewed  Decide to obtain previous medical records or to obtain history from someone other than the patient: yes                 Patient Care Considerations:    CT HEAD: I considered ordering a noncontrast CT of the head, however the patient has no focal neurologic deficits and no trauma.      Consultants/Shared Management Plan:    Hospitalist: I have discussed the case with hospitalist who agrees to accept the patient for admission.    Social Determinants of Health:    Patient is unable to carry out activities of daily life. Escalation of care is necessary.       Disposition and Care Coordination:    Admit:   Through independent evaluation of the patient's history, physical, and imperical data, the patient meets criteria for inpatient admission to the hospital.        Final diagnoses:   Weakness   Dehydration   Anemia, unspecified type        ED Disposition       ED Disposition   Decision to Admit    Condition   --    Comment   Level of Care: Remote Telemetry [26]   Diagnosis: Generalized weakness [141660]   Admitting Physician: MARIA EUGENIA VO [[id number]]   Attending Physician: MARIA EUGENIA VO [[id number]]                 This medical record created using voice recognition software.             Anibal Phan,   06/20/24 0202

## 2024-06-20 NOTE — PLAN OF CARE
Goal Outcome Evaluation:           Progress: no change  Outcome Evaluation: Patient disoriented to time in AM. Irritable and wants to leave as soon as possible. Patient had no other complaints. Sputum and urine samples collected

## 2024-06-20 NOTE — PLAN OF CARE
Goal Outcome Evaluation:  Plan of Care Reviewed With: patient        Progress: no change  Outcome Evaluation: Patient presents with limitations affecting strength, activity tolerance, and balance impacting patient's ability to return home safely and independently.  The skills of a therapist will be required to safely and effectively implement the following treatment plan to restore maximal level of function      Anticipated Discharge Disposition (OT): skilled nursing facility, inpatient rehabilitation facility, sub acute care setting

## 2024-06-20 NOTE — PROGRESS NOTES
Respiratory Therapist Broncho-Pulmonary Hygiene Progress Note      Patient Name:  Arron Valencia  YOB: 1940    Arron Valencia meets the qualification for Level 1 of the Bronco-Pulmonary Hygiene Protocol. This was based on my daily patient assessment and includes review of chest x-ray results, cough ability and quality, oxygenation, secretions or risk for secretion development and patient mobility.     Broncho-Pulmonary Hygiene Assessment:    Level of Movement: Actively changing positions-requires assistance  Disoriented/Follows Commands    Breath Sounds: Clear to slightly diminished    Cough: Strong, effective    Chest X-Ray: Possible signs of consolidation and/or atelectasis or clear.     Sputum Productions: None or small amount of thin or watery secretions with effective cough    History and Physical: Chronic condition    SpO2 to Oxygen Need: greater than 92% on room air or  less than 3L nasal canula    Current SpO2 is: 93 on 2lnc    Based on this information, I have completed the following interventions: Teach/Instruct patient on cough and deep breathe      Electronically signed by Di Carrizales RRT, 06/20/24, 4:08 PM EDT.      Calm

## 2024-06-20 NOTE — PLAN OF CARE
Goal Outcome Evaluation:   PT A&O times 4 able to voice needs and wants PRN tylenol given for neck discomfort, PT admitted from ED for gen weakness, anemia, daughter at bedside pt not happy about being here he just wants to feel better and get back home and take care of himself cont current POC

## 2024-06-21 LAB
ANION GAP SERPL CALCULATED.3IONS-SCNC: 12.6 MMOL/L (ref 5–15)
B PARAPERT DNA SPEC QL NAA+PROBE: NOT DETECTED
B PERT DNA SPEC QL NAA+PROBE: NOT DETECTED
BACTERIA SPEC AEROBE CULT: NORMAL
BASOPHILS # BLD AUTO: 0.04 10*3/MM3 (ref 0–0.2)
BASOPHILS NFR BLD AUTO: 1.1 % (ref 0–1.5)
BUN SERPL-MCNC: 30 MG/DL (ref 8–23)
BUN/CREAT SERPL: 23.1 (ref 7–25)
C PNEUM DNA NPH QL NAA+NON-PROBE: NOT DETECTED
CALCIUM SPEC-SCNC: 8.9 MG/DL (ref 8.6–10.5)
CHLORIDE SERPL-SCNC: 99 MMOL/L (ref 98–107)
CO2 SERPL-SCNC: 24.4 MMOL/L (ref 22–29)
CREAT SERPL-MCNC: 1.3 MG/DL (ref 0.76–1.27)
DEPRECATED RDW RBC AUTO: 50.7 FL (ref 37–54)
EGFRCR SERPLBLD CKD-EPI 2021: 53.8 ML/MIN/1.73
EOSINOPHIL # BLD AUTO: 0 10*3/MM3 (ref 0–0.4)
EOSINOPHIL NFR BLD AUTO: 0 % (ref 0.3–6.2)
ERYTHROCYTE [DISTWIDTH] IN BLOOD BY AUTOMATED COUNT: 16.2 % (ref 12.3–15.4)
FLUAV SUBTYP SPEC NAA+PROBE: NOT DETECTED
FLUBV RNA ISLT QL NAA+PROBE: NOT DETECTED
GLUCOSE BLDC GLUCOMTR-MCNC: 119 MG/DL (ref 70–99)
GLUCOSE BLDC GLUCOMTR-MCNC: 136 MG/DL (ref 70–99)
GLUCOSE BLDC GLUCOMTR-MCNC: 192 MG/DL (ref 70–99)
GLUCOSE BLDC GLUCOMTR-MCNC: 211 MG/DL (ref 70–99)
GLUCOSE SERPL-MCNC: 137 MG/DL (ref 65–99)
HADV DNA SPEC NAA+PROBE: NOT DETECTED
HCOV 229E RNA SPEC QL NAA+PROBE: NOT DETECTED
HCOV HKU1 RNA SPEC QL NAA+PROBE: NOT DETECTED
HCOV NL63 RNA SPEC QL NAA+PROBE: NOT DETECTED
HCOV OC43 RNA SPEC QL NAA+PROBE: NOT DETECTED
HCT VFR BLD AUTO: 29.4 % (ref 37.5–51)
HGB BLD-MCNC: 9.1 G/DL (ref 13–17.7)
HMPV RNA NPH QL NAA+NON-PROBE: NOT DETECTED
HPIV1 RNA ISLT QL NAA+PROBE: NOT DETECTED
HPIV2 RNA SPEC QL NAA+PROBE: NOT DETECTED
HPIV3 RNA NPH QL NAA+PROBE: NOT DETECTED
HPIV4 P GENE NPH QL NAA+PROBE: NOT DETECTED
IMM GRANULOCYTES # BLD AUTO: 0.44 10*3/MM3 (ref 0–0.05)
IMM GRANULOCYTES NFR BLD AUTO: 12.5 % (ref 0–0.5)
LYMPHOCYTES # BLD AUTO: 0.67 10*3/MM3 (ref 0.7–3.1)
LYMPHOCYTES NFR BLD AUTO: 19.1 % (ref 19.6–45.3)
M PNEUMO IGG SER IA-ACNC: NOT DETECTED
MAGNESIUM SERPL-MCNC: 2.1 MG/DL (ref 1.6–2.4)
MCH RBC QN AUTO: 26.8 PG (ref 26.6–33)
MCHC RBC AUTO-ENTMCNC: 31 G/DL (ref 31.5–35.7)
MCV RBC AUTO: 86.5 FL (ref 79–97)
MONOCYTES # BLD AUTO: 0.41 10*3/MM3 (ref 0.1–0.9)
MONOCYTES NFR BLD AUTO: 11.7 % (ref 5–12)
NEUTROPHILS NFR BLD AUTO: 1.95 10*3/MM3 (ref 1.7–7)
NEUTROPHILS NFR BLD AUTO: 55.6 % (ref 42.7–76)
NRBC BLD AUTO-RTO: 0 /100 WBC (ref 0–0.2)
PLATELET # BLD AUTO: 198 10*3/MM3 (ref 140–450)
PMV BLD AUTO: 10.2 FL (ref 6–12)
POTASSIUM SERPL-SCNC: 4.7 MMOL/L (ref 3.5–5.2)
PROCALCITONIN SERPL-MCNC: 0.08 NG/ML (ref 0–0.25)
RBC # BLD AUTO: 3.4 10*6/MM3 (ref 4.14–5.8)
RHINOVIRUS RNA SPEC NAA+PROBE: NOT DETECTED
RSV RNA NPH QL NAA+NON-PROBE: NOT DETECTED
SARS-COV-2 RNA RESP QL NAA+PROBE: NOT DETECTED
SODIUM SERPL-SCNC: 136 MMOL/L (ref 136–145)
WBC NRBC COR # BLD AUTO: 3.51 10*3/MM3 (ref 3.4–10.8)

## 2024-06-21 PROCEDURE — 0202U NFCT DS 22 TRGT SARS-COV-2: CPT | Performed by: STUDENT IN AN ORGANIZED HEALTH CARE EDUCATION/TRAINING PROGRAM

## 2024-06-21 PROCEDURE — 94760 N-INVAS EAR/PLS OXIMETRY 1: CPT

## 2024-06-21 PROCEDURE — 63710000001 PREDNISONE PER 1 MG: Performed by: INTERNAL MEDICINE

## 2024-06-21 PROCEDURE — 93005 ELECTROCARDIOGRAM TRACING: CPT | Performed by: STUDENT IN AN ORGANIZED HEALTH CARE EDUCATION/TRAINING PROGRAM

## 2024-06-21 PROCEDURE — 97161 PT EVAL LOW COMPLEX 20 MIN: CPT

## 2024-06-21 PROCEDURE — 25010000002 CEFTRIAXONE PER 250 MG: Performed by: INTERNAL MEDICINE

## 2024-06-21 PROCEDURE — 85025 COMPLETE CBC W/AUTO DIFF WBC: CPT | Performed by: STUDENT IN AN ORGANIZED HEALTH CARE EDUCATION/TRAINING PROGRAM

## 2024-06-21 PROCEDURE — 93010 ELECTROCARDIOGRAM REPORT: CPT | Performed by: INTERNAL MEDICINE

## 2024-06-21 PROCEDURE — 94799 UNLISTED PULMONARY SVC/PX: CPT

## 2024-06-21 PROCEDURE — 25810000003 SODIUM CHLORIDE 0.9 % SOLUTION: Performed by: INTERNAL MEDICINE

## 2024-06-21 PROCEDURE — 99222 1ST HOSP IP/OBS MODERATE 55: CPT | Performed by: STUDENT IN AN ORGANIZED HEALTH CARE EDUCATION/TRAINING PROGRAM

## 2024-06-21 PROCEDURE — G0378 HOSPITAL OBSERVATION PER HR: HCPCS

## 2024-06-21 PROCEDURE — 63710000001 INSULIN LISPRO (HUMAN) PER 5 UNITS: Performed by: STUDENT IN AN ORGANIZED HEALTH CARE EDUCATION/TRAINING PROGRAM

## 2024-06-21 PROCEDURE — 80048 BASIC METABOLIC PNL TOTAL CA: CPT | Performed by: STUDENT IN AN ORGANIZED HEALTH CARE EDUCATION/TRAINING PROGRAM

## 2024-06-21 PROCEDURE — 83735 ASSAY OF MAGNESIUM: CPT | Performed by: STUDENT IN AN ORGANIZED HEALTH CARE EDUCATION/TRAINING PROGRAM

## 2024-06-21 PROCEDURE — 99233 SBSQ HOSP IP/OBS HIGH 50: CPT | Performed by: INTERNAL MEDICINE

## 2024-06-21 PROCEDURE — 84145 PROCALCITONIN (PCT): CPT | Performed by: STUDENT IN AN ORGANIZED HEALTH CARE EDUCATION/TRAINING PROGRAM

## 2024-06-21 PROCEDURE — 25010000002 AZITHROMYCIN PER 500 MG: Performed by: INTERNAL MEDICINE

## 2024-06-21 PROCEDURE — 82948 REAGENT STRIP/BLOOD GLUCOSE: CPT

## 2024-06-21 PROCEDURE — 82948 REAGENT STRIP/BLOOD GLUCOSE: CPT | Performed by: STUDENT IN AN ORGANIZED HEALTH CARE EDUCATION/TRAINING PROGRAM

## 2024-06-21 RX ADMIN — CEFTRIAXONE SODIUM 1000 MG: 1 INJECTION, POWDER, FOR SOLUTION INTRAMUSCULAR; INTRAVENOUS at 14:50

## 2024-06-21 RX ADMIN — CYANOCOBALAMIN TAB 500 MCG 500 MCG: 500 TAB at 09:31

## 2024-06-21 RX ADMIN — BUDESONIDE 0.5 MG: 0.5 INHALANT ORAL at 18:57

## 2024-06-21 RX ADMIN — IPRATROPIUM BROMIDE AND ALBUTEROL SULFATE 3 ML: .5; 3 SOLUTION RESPIRATORY (INHALATION) at 01:30

## 2024-06-21 RX ADMIN — PRAVASTATIN SODIUM 40 MG: 20 TABLET ORAL at 20:46

## 2024-06-21 RX ADMIN — Medication 10 ML: at 20:47

## 2024-06-21 RX ADMIN — IPRATROPIUM BROMIDE AND ALBUTEROL SULFATE 3 ML: .5; 3 SOLUTION RESPIRATORY (INHALATION) at 12:32

## 2024-06-21 RX ADMIN — INSULIN LISPRO 2 UNITS: 100 INJECTION, SOLUTION INTRAVENOUS; SUBCUTANEOUS at 17:27

## 2024-06-21 RX ADMIN — PREDNISONE 40 MG: 20 TABLET ORAL at 09:35

## 2024-06-21 RX ADMIN — Medication 10 ML: at 09:35

## 2024-06-21 RX ADMIN — INSULIN LISPRO 3 UNITS: 100 INJECTION, SOLUTION INTRAVENOUS; SUBCUTANEOUS at 20:47

## 2024-06-21 RX ADMIN — SOTALOL HYDROCHLORIDE 40 MG: 80 TABLET ORAL at 09:31

## 2024-06-21 RX ADMIN — BUDESONIDE 0.5 MG: 0.5 INHALANT ORAL at 07:15

## 2024-06-21 RX ADMIN — ARFORMOTEROL TARTRATE 15 MCG: 15 SOLUTION RESPIRATORY (INHALATION) at 18:57

## 2024-06-21 RX ADMIN — ARFORMOTEROL TARTRATE 15 MCG: 15 SOLUTION RESPIRATORY (INHALATION) at 07:15

## 2024-06-21 RX ADMIN — ASPIRIN 81 MG: 81 TABLET, COATED ORAL at 09:35

## 2024-06-21 RX ADMIN — GABAPENTIN 300 MG: 300 CAPSULE ORAL at 21:35

## 2024-06-21 RX ADMIN — AZITHROMYCIN MONOHYDRATE 500 MG: 500 INJECTION, POWDER, LYOPHILIZED, FOR SOLUTION INTRAVENOUS at 13:43

## 2024-06-21 RX ADMIN — IPRATROPIUM BROMIDE AND ALBUTEROL SULFATE 3 ML: .5; 3 SOLUTION RESPIRATORY (INHALATION) at 18:57

## 2024-06-21 RX ADMIN — SOTALOL HYDROCHLORIDE 40 MG: 80 TABLET ORAL at 20:46

## 2024-06-21 RX ADMIN — IPRATROPIUM BROMIDE AND ALBUTEROL SULFATE 3 ML: .5; 3 SOLUTION RESPIRATORY (INHALATION) at 07:15

## 2024-06-21 RX ADMIN — GABAPENTIN 300 MG: 300 CAPSULE ORAL at 06:21

## 2024-06-21 RX ADMIN — FERROUS SULFATE TAB 325 MG (65 MG ELEMENTAL FE) 325 MG: 325 (65 FE) TAB at 09:31

## 2024-06-21 RX ADMIN — GABAPENTIN 300 MG: 300 CAPSULE ORAL at 13:39

## 2024-06-21 NOTE — PLAN OF CARE
Problem: Adult Inpatient Plan of Care  Goal: Plan of Care Review  Outcome: Ongoing, Progressing  Flowsheets (Taken 6/21/2024 1639)  Progress: no change  Plan of Care Reviewed With: patient  Outcome Evaluation: Patient is alert and oriented x4. Patient has had no complaints this shift and has no complaints at this time.  Goal: Patient-Specific Goal (Individualized)  Outcome: Ongoing, Progressing  Goal: Absence of Hospital-Acquired Illness or Injury  Outcome: Ongoing, Progressing  Intervention: Identify and Manage Fall Risk  Recent Flowsheet Documentation  Taken 6/21/2024 1638 by Sonja Killian RN  Safety Promotion/Fall Prevention:   nonskid shoes/slippers when out of bed   safety round/check completed  Taken 6/21/2024 1450 by Sonja Killian RN  Safety Promotion/Fall Prevention:   nonskid shoes/slippers when out of bed   safety round/check completed  Taken 6/21/2024 1343 by Sonja Killian RN  Safety Promotion/Fall Prevention:   nonskid shoes/slippers when out of bed   safety round/check completed  Taken 6/21/2024 1156 by Sonja Killian RN  Safety Promotion/Fall Prevention:   nonskid shoes/slippers when out of bed   safety round/check completed  Taken 6/21/2024 0939 by Sonja Killian RN  Safety Promotion/Fall Prevention:   nonskid shoes/slippers when out of bed   safety round/check completed  Taken 6/21/2024 0754 by Sonja Killian RN  Safety Promotion/Fall Prevention:   assistive device/personal items within reach   clutter free environment maintained   lighting adjusted   nonskid shoes/slippers when out of bed   room organization consistent   safety round/check completed  Taken 6/21/2024 0714 by Sonja Killian RN  Safety Promotion/Fall Prevention:   nonskid shoes/slippers when out of bed   safety round/check completed  Intervention: Prevent and Manage VTE (Venous Thromboembolism) Risk  Recent Flowsheet Documentation  Taken 6/21/2024 0754 by Sonja Killian RN  Range of Motion: active ROM (range of  motion) encouraged  Intervention: Prevent Infection  Recent Flowsheet Documentation  Taken 6/21/2024 0754 by Sonja Killian RN  Infection Prevention:   cohorting utilized   environmental surveillance performed   equipment surfaces disinfected   hand hygiene promoted   personal protective equipment utilized   rest/sleep promoted   single patient room provided  Goal: Optimal Comfort and Wellbeing  Outcome: Ongoing, Progressing  Intervention: Provide Person-Centered Care  Recent Flowsheet Documentation  Taken 6/21/2024 0754 by Sonja Killian RN  Trust Relationship/Rapport:   care explained   choices provided   emotional support provided   empathic listening provided   questions answered   questions encouraged   reassurance provided   thoughts/feelings acknowledged  Goal: Readiness for Transition of Care  Outcome: Ongoing, Progressing     Problem: Fall Injury Risk  Goal: Absence of Fall and Fall-Related Injury  Outcome: Ongoing, Progressing  Intervention: Identify and Manage Contributors  Recent Flowsheet Documentation  Taken 6/21/2024 0754 by Sonja Killian RN  Medication Review/Management:   medications reviewed   high-risk medications identified  Intervention: Promote Injury-Free Environment  Recent Flowsheet Documentation  Taken 6/21/2024 1638 by Sonja Killian RN  Safety Promotion/Fall Prevention:   nonskid shoes/slippers when out of bed   safety round/check completed  Taken 6/21/2024 1450 by Sonja Killian RN  Safety Promotion/Fall Prevention:   nonskid shoes/slippers when out of bed   safety round/check completed  Taken 6/21/2024 1343 by Sonja Killian, RN  Safety Promotion/Fall Prevention:   nonskid shoes/slippers when out of bed   safety round/check completed  Taken 6/21/2024 1156 by Sonja Killian, RN  Safety Promotion/Fall Prevention:   nonskid shoes/slippers when out of bed   safety round/check completed  Taken 6/21/2024 0939 by Sonja Killian, RN  Safety Promotion/Fall Prevention:   nonskid  shoes/slippers when out of bed   safety round/check completed  Taken 6/21/2024 0754 by Sonja Killian RN  Safety Promotion/Fall Prevention:   assistive device/personal items within reach   clutter free environment maintained   lighting adjusted   nonskid shoes/slippers when out of bed   room organization consistent   safety round/check completed  Taken 6/21/2024 0714 by Sonja Killian RN  Safety Promotion/Fall Prevention:   nonskid shoes/slippers when out of bed   safety round/check completed     Problem: Skin Injury Risk Increased  Goal: Skin Health and Integrity  Outcome: Ongoing, Progressing  Intervention: Optimize Skin Protection  Recent Flowsheet Documentation  Taken 6/21/2024 1638 by Sonja Killian RN  Head of Bed (HOB) Positioning: HOB elevated  Taken 6/21/2024 1450 by Sonja Killian RN  Head of Bed (HOB) Positioning: HOB elevated  Taken 6/21/2024 1343 by Sonja Killian RN  Head of Bed (HOB) Positioning: HOB elevated  Taken 6/21/2024 1156 by Sonja Killian RN  Head of Bed (HOB) Positioning: HOB elevated  Taken 6/21/2024 0939 by Sonja Killian RN  Head of Bed (HOB) Positioning: HOB elevated  Taken 6/21/2024 0714 by Sonja Killian RN  Head of Bed (HOB) Positioning: HOB elevated     Problem: Diabetes Comorbidity  Goal: Blood Glucose Level Within Targeted Range  Outcome: Ongoing, Progressing     Problem: Pain Chronic (Persistent) (Comorbidity Management)  Goal: Acceptable Pain Control and Functional Ability  Outcome: Ongoing, Progressing  Intervention: Manage Persistent Pain  Recent Flowsheet Documentation  Taken 6/21/2024 0754 by Sonja Killian RN  Medication Review/Management:   medications reviewed   high-risk medications identified     Problem: Anemia  Goal: Anemia Symptom Improvement  Outcome: Ongoing, Progressing  Intervention: Monitor and Manage Anemia  Recent Flowsheet Documentation  Taken 6/21/2024 1638 by Sonja Killian RN  Safety Promotion/Fall Prevention:   nonskid shoes/slippers  when out of bed   safety round/check completed  Taken 6/21/2024 1450 by Sonja Killian RN  Safety Promotion/Fall Prevention:   nonskid shoes/slippers when out of bed   safety round/check completed  Taken 6/21/2024 1343 by Sonja Killian RN  Safety Promotion/Fall Prevention:   nonskid shoes/slippers when out of bed   safety round/check completed  Taken 6/21/2024 1156 by Sonja Killian RN  Safety Promotion/Fall Prevention:   nonskid shoes/slippers when out of bed   safety round/check completed  Taken 6/21/2024 0939 by Sonja Killian RN  Safety Promotion/Fall Prevention:   nonskid shoes/slippers when out of bed   safety round/check completed  Taken 6/21/2024 0754 by Sonja Killian RN  Safety Promotion/Fall Prevention:   assistive device/personal items within reach   clutter free environment maintained   lighting adjusted   nonskid shoes/slippers when out of bed   room organization consistent   safety round/check completed  Taken 6/21/2024 0714 by Sonja Killian RN  Safety Promotion/Fall Prevention:   nonskid shoes/slippers when out of bed   safety round/check completed   Goal Outcome Evaluation:  Plan of Care Reviewed With: patient        Progress: no change  Outcome Evaluation: Patient is alert and oriented x4. Patient has had no complaints this shift and has no complaints at this time.

## 2024-06-21 NOTE — THERAPY EVALUATION
Acute Care - Physical Therapy Initial Evaluation   [place]     Patient Name: Arron Valencia  : 1940  MRN: [id number]  Today's Date: 2024      Visit Dx:     ICD-10-CM ICD-9-CM   1. Weakness  R53.1 780.79   2. Dehydration  E86.0 276.51   3. Anemia, unspecified type  D64.9 285.9   4. Difficulty walking  R26.2 719.7     Patient Active Problem List   Diagnosis    Generalized weakness     Past Medical History:   Diagnosis Date    COPD (chronic obstructive pulmonary disease)     Diabetes mellitus     GERD (gastroesophageal reflux disease)     Hyperlipidemia     Hypertension     Stroke      Past Surgical History:   Procedure Laterality Date    ABDOMINAL SURGERY      COLON SURGERY      COLONOSCOPY       PT Assessment (Last 12 Hours)       PT Evaluation and Treatment       Row Name 24 1100          Physical Therapy Time and Intention    Document Type evaluation  -AV     Mode of Treatment individual therapy;physical therapy  -AV     Symptoms Noted During/After Treatment fatigue  -AV       Row Name 24 1100          General Information    Patient Profile Reviewed yes  -AV     Patient Observations alert;cooperative;agree to therapy  -AV     Prior Level of Function independent:;all household mobility;gait;transfer;ADL's  Ambulated with STC. Reports having RW to use if needed. No home O2.  -AV     Equipment Currently Used at Home cane, straight  Reports having RW to use if needed.  -AV     Existing Precautions/Restrictions fall  -AV       Row Name 24 1100          Living Environment    Current Living Arrangements home  -AV     People in Home alone  -AV       Row Name 24 1100          Cognition    Orientation Status (Cognition) oriented x 3  -AV       Row Name 24 1100          Range of Motion (ROM)    Range of Motion bilateral lower extremities;ROM is WFL  -AV       Row Name 24 1100          Strength (Manual Muscle Testing)    Strength (Manual Muscle Testing) bilateral lower  extremities;strength is WFL  -AV       Row Name 06/21/24 1100          Bed Mobility    Comment, (Bed Mobility) Patient seated upright in recliner upon therapist entry  -AV       Row Name 06/21/24 1100          Transfers    Transfers sit-stand transfer;stand-sit transfer  -AV       Row Name 06/21/24 1100          Sit-Stand Transfer    Sit-Stand Edwards (Transfers) contact guard  -AV     Assistive Device (Sit-Stand Transfers) walker, front-wheeled  -AV       Row Name 06/21/24 1100          Stand-Sit Transfer    Stand-Sit Edwards (Transfers) contact guard  -AV     Assistive Device (Stand-Sit Transfers) walker, front-wheeled  -AV       Row Name 06/21/24 1100          Gait/Stairs (Locomotion)    Gait/Stairs Locomotion gait/ambulation independence;gait/ambulation assistive device;distance ambulated  -AV     [place] Level (Gait) contact guard  -AV     Assistive Device (Gait) walker, front-wheeled  -AV     Distance in Feet (Gait) 50  -AV     Pattern (Gait) step-to  -AV     Deviations/Abnormal Patterns (Gait) gait speed decreased;stride length decreased  -AV     Bilateral Gait Deviations forward flexed posture  -AV       Row Name 06/21/24 1100          Safety Issues, Functional Mobility    Impairments Affecting Function (Mobility) balance;endurance/activity tolerance  -AV       Row Name 06/21/24 1100          Balance    Balance Assessment standing dynamic balance  -AV     Dynamic Standing Balance contact guard  -AV     Position/Device Used, Standing Balance supported;walker, front-wheeled  -AV       Row Name             Wound 06/20/24 0356 Right nose    Wound - Properties Group Placement Date: 06/20/24  -FM Placement Time: 0356  -FM Present on Original Admission: Y  -FM Side: Right  -FM Location: nose  -FM    Retired Wound - Properties Group Placement Date: 06/20/24  -FM Placement Time: 0356  -FM Present on Original Admission: Y  -FM Side: Right  -FM Location: nose  -FM    Retired Wound - Properties Group Date  first assessed: 06/20/24  -FM Time first assessed: 0356  -FM Present on Original Admission: Y  -FM Side: Right  -FM Location: nose  -FM      Row Name             Wound 06/20/24 0400 Left lateral plantar    Wound - Properties Group Placement Date: 06/20/24  -FM Placement Time: 0400  -FM Present on Original Admission: Y  -FM Side: Left  -FM Orientation: lateral  -FM Location: plantar  -FM    Retired Wound - Properties Group Placement Date: 06/20/24  -FM Placement Time: 0400  -FM Present on Original Admission: Y  -FM Side: Left  -FM Orientation: lateral  -FM Location: plantar  -FM    Retired Wound - Properties Group Date first assessed: 06/20/24  -FM Time first assessed: 0400  -FM Present on Original Admission: Y  -FM Side: Left  -FM Location: plantar  -FM      Row Name 06/21/24 1100          Plan of Care Review    Plan of Care Reviewed With patient  -AV     Progress no change  -AV     Outcome Evaluation Patient presents with deficits in balance, endurance, transfers, and ambulation. Patient will benefit from skilled PT services to address these mobility deficits and decrease risk of falls.  -AV       Row Name 06/21/24 1100          Positioning and Restraints    Pre-Treatment Position sitting in chair/recliner  -AV     Post Treatment Position chair  -AV     In Chair sitting;call light within reach;encouraged to call for assist  No alarms active upon entry  -AV       Row Name 06/21/24 1100          Therapy Assessment/Plan (PT)    Rehab Potential (PT) good, to achieve stated therapy goals  -AV     Criteria for Skilled Interventions Met (PT) yes;meets criteria  -AV     Therapy Frequency (PT) daily  -AV     Predicted Duration of Therapy Intervention (PT) 10 days  -AV     Problem List (PT) problems related to;balance;mobility  -AV     Activity Limitations Related to Problem List (PT) unable to transfer safely;unable to ambulate safely  -AV       Row Name 06/21/24 1100          PT Evaluation Complexity    History, PT  Evaluation Complexity no personal factors and/or comorbidities  -AV     Examination of Body Systems (PT Eval Complexity) total of 4 or more elements  -AV     Clinical Presentation (PT Evaluation Complexity) stable  -AV     Clinical Decision Making (PT Evaluation Complexity) low complexity  -AV     Overall Complexity (PT Evaluation Complexity) low complexity  -AV       Row Name 06/21/24 1100          Therapy Plan Review/Discharge Plan (PT)    Therapy Plan Review (PT) evaluation/treatment results reviewed;patient  -AV       Row Name 06/21/24 1100          Physical Therapy Goals    Bed Mobility Goal Selection (PT) bed mobility, PT goal 1  -AV     Transfer Goal Selection (PT) transfer, PT goal 1  -AV     Gait Training Goal Selection (PT) gait training, PT goal 1  -AV       Row Name 06/21/24 1100          Bed Mobility Goal 1 (PT)    Activity/Assistive Device (Bed Mobility Goal 1, PT) sit to supine/supine to sit  -AV     [place] Level/Cues Needed (Bed Mobility Goal 1, PT) modified independence  -AV     Time Frame (Bed Mobility Goal 1, PT) 10 days  -AV       Row Name 06/21/24 1100          Transfer Goal 1 (PT)    Activity/Assistive Device (Transfer Goal 1, PT) sit-to-stand/stand-to-sit;bed-to-chair/chair-to-bed;walker, rolling  -AV     [place] Level/Cues Needed (Transfer Goal 1, PT) modified independence  -AV     Time Frame (Transfer Goal 1, PT) 10 days  -AV       Row Name 06/21/24 1100          Gait Training Goal 1 (PT)    Activity/Assistive Device (Gait Training Goal 1, PT) gait (walking locomotion);assistive device use;walker, rolling  -AV     [place] Level (Gait Training Goal 1, PT) modified independence  -AV     Distance (Gait Training Goal 1, PT) 200  -AV     Time Frame (Gait Training Goal 1, PT) 10 days  -AV               User Key  (r) = Recorded By, (t) = Taken By, (c) = Cosigned By      Initials Name Provider Type    Davida Torre, RN Registered Nurse    Francesco Jimenez, PT Physical  Therapist                    Physical Therapy Education       Title: PT OT SLP Therapies (In Progress)       Topic: Physical Therapy (In Progress)       Point: Mobility training (Done)       Learning Progress Summary             Patient Acceptance, E,TB, VU by AV at 6/21/2024 1150                         Point: Home exercise program (Not Started)       Learner Progress:  Not documented in this visit.              Point: Body mechanics (Done)       Learning Progress Summary             Patient Acceptance, E,TB, VU by AV at 6/21/2024 1150                         Point: Precautions (Done)       Learning Progress Summary             Patient Acceptance, E,TB, VU by AV at 6/21/2024 1150                                         User Key       Initials Effective Dates Name Provider Type Discipline    AV 06/11/21 -  Francesco Covarrubias, PT Physical Therapist PT                  PT Recommendation and Plan  Anticipated Discharge Disposition (PT): skilled nursing facility, inpatient rehabilitation facility  Planned Therapy Interventions (PT): balance training, bed mobility training, gait training, home exercise program, neuromuscular re-education, strengthening, transfer training  Therapy Frequency (PT): daily  Plan of Care Reviewed With: patient  Progress: no change  Outcome Evaluation: Patient presents with deficits in balance, endurance, transfers, and ambulation. Patient will benefit from skilled PT services to address these mobility deficits and decrease risk of falls.   Outcome Measures       Row Name 06/21/24 1100             How much help from another person do you currently need...    Turning from your back to your side while in flat bed without using bedrails? 4  -AV      Moving from lying on back to sitting on the side of a flat bed without bedrails? 3  -AV      Moving to and from a bed to a chair (including a wheelchair)? 3  -AV      Standing up from a chair using your arms (e.g., wheelchair, bedside chair)? 3  -AV       Climbing 3-5 steps with a railing? 2  -AV      To walk in hospital room? 3  -AV      AM-PAC 6 Clicks Score (PT) 18  -AV      Highest Level of Mobility Goal 6 --> Walk 10 steps or more  -AV         Functional Assessment    Outcome Measure Options AM-PAC 6 Clicks Basic Mobility (PT)  -AV                User Key  (r) = Recorded By, (t) = Taken By, (c) = Cosigned By      Initials Name Provider Type    AV Francesco Covarrubias, PT Physical Therapist                     Time Calculation:    PT Charges       Row Name 06/21/24 1150             Time Calculation    PT Received On 06/21/24  -AV      PT Goal Re-Cert Due Date 06/30/24  -AV         Untimed Charges    PT Eval/Re-eval Minutes 35  -AV         Total Minutes    Untimed Charges Total Minutes 35  -AV       Total Minutes 35  -AV                User Key  (r) = Recorded By, (t) = Taken By, (c) = Cosigned By      Initials Name Provider Type    AV Francesco Covarrubias, PT Physical Therapist                  Therapy Charges for Today       Code Description Service Date Service Provider Modifiers Qty    70495481288 HC PT EVAL LOW COMPLEXITY 3 6/21/2024 Francesco Covarrubias, PT GP 1            PT G-Codes  Outcome Measure Options: AM-PAC 6 Clicks Basic Mobility (PT)  AM-PAC 6 Clicks Score (PT): 18  AM-PAC 6 Clicks Score (OT): 11    Francesco Covarrubias, PT  6/21/2024

## 2024-06-21 NOTE — PLAN OF CARE
Goal Outcome Evaluation:   PT A&O able to voice needs and wants no c/o pain or s/s of distress, PT pleasant and cooperative this shift cont current POC

## 2024-06-21 NOTE — PROGRESS NOTES
Respiratory Therapist Broncho-Pulmonary Hygiene Progress Note      Patient Name:  Arron Valencia  YOB: 1940    Arron Valencia meets the qualification for Level 1 of the Bronco-Pulmonary Hygiene Protocol. This was based on my daily patient assessment and includes review of chest x-ray results, cough ability and quality, oxygenation, secretions or risk for secretion development and patient mobility.     Broncho-Pulmonary Hygiene Assessment:    Level of Movement: Actively changing positions-requires assistance  Disoriented/Follows Commands    Breath Sounds: Clear to slightly diminished    Cough: Strong, effective    Chest X-Ray: Possible signs of consolidation and/or atelectasis or clear.     Sputum Productions: None or small amount of thin or watery secretions with effective cough    History and Physical: Chronic condition    SpO2 to Oxygen Need: greater than 92% on room air or  less than 3L nasal canula    Current SpO2 is: 96  on 2lnc    Based on this information, I have completed the following interventions: Teach/Instruct patient on cough and deep breathe      Electronically signed by Di Carrizales RRT, 06/21/24, 7:19 AM EDT.

## 2024-06-21 NOTE — CONSULTS
Pulmonary / Critical Care Consult Note      Patient Name: Arron Valencia  : 1940  MRN: [id number]  Primary Care Physician:  Shanti Gutierres APRN  Referring Physician: Camacho Dumont MD  Date of admission: 2024    Subjective   Subjective     Reason for Consult/ Chief Complaint:   Acute hypoxic respiratory failure    HPI:  Arron Valencia is a 84 y.o. male with history of diabetes, GERD, A-fib, presented to the ED for generalized weakness.  Patient is elderly male patient with very poor historian and denied all questions that were asked.  Most of the history obtained by EMR.  Pulmonary was consulted for shortness of breath and exertional dyspnea that has been ongoing for the last few weeks.  However when I asked him about this he said no.  In the ED patient was found to be mildly hypoxic and was placed on 2 L nasal cannula.  Initial labs significant for anemia with hemoglobin 8.3.  Creatinine 1.5 which has now improved to 1.3.  Patient also had a chest CT with contrast which showed possible patchy groundglass opacities in the upper lobes, left greater than right.  There are also some pulmonary nodules, largest of which noted to be 7 mm in the left upper lobe.  Mildly prominent mediastinal lymphadenopathy also noted.  Pulmonary emphysematous changes and scarring seen as well.    Review of Systems  Constitutional symptoms:  Denied complaints   Cardiovascular:  Denied complaints  Respiratory: Denied complaints  Gastrointestinal: Denied complaints  Neurologic: Denied complaints    Personal History     Past Medical History:   Diagnosis Date    COPD (chronic obstructive pulmonary disease)     Diabetes mellitus     GERD (gastroesophageal reflux disease)     Hyperlipidemia     Hypertension     Stroke        Past Surgical History:   Procedure Laterality Date    ABDOMINAL SURGERY      COLON SURGERY      COLONOSCOPY         Family History: family history is not on file. Otherwise pertinent FHx was  reviewed and not pertinent to current issue.    Social History:  reports that he has never smoked. He has never been exposed to tobacco smoke. His smokeless tobacco use includes snuff. He reports current alcohol use of about 1.0 standard drink of alcohol per week. He reports that he does not currently use drugs.    Home Medications:  Budeson-Glycopyrrol-Formoterol, HYDROcodone-acetaminophen, aspirin, gabapentin, metFORMIN, omeprazole, pravastatin, and sotalol    Allergies:  No Known Allergies    Objective    Objective     Vitals:   Temp:  [97.3 °F (36.3 °C)-98 °F (36.7 °C)] 97.3 °F (36.3 °C)  Heart Rate:  [63-79] 71  Resp:  [16-20] 18  BP: (101-113)/(57-71) 104/60  Flow (L/min):  [1-2] 2    Physical Exam:  Vital Signs Reviewed   General:  Alert, NAD.  Elderly male, lying in bed  HEENT:  PERRL, EOMI.  OP, nose scar  Neck:  Supple, no JVD, no thyromegaly  Chest:  good aeration, no work of breathing noted on 2 L nasal cannula  CV: RRR, no MGR, pulses 2+, equal.  Abd:  Soft, NT, ND, + BS  EXT:  no clubbing, no cyanosis, no edema, no joint tenderness  Neuro:  A&Ox3, CN grossly intact, no focal deficits.  Skin: No rashes or lesions noted    Result Review    Result Review:  I have personally reviewed the results from the time of this admission to 6/21/2024 13:42 EDT and agree with these findings:  []  Laboratory  []  Microbiology  []  Radiology  []  EKG/Telemetry   []  Cardiology/Vascular   []  Pathology  []  Old records  []  Other:    Most notable findings include:   -     Assessment & Plan   Assessment / Plan     Active Hospital Problems:  Active Hospital Problems    Diagnosis     **Generalized weakness          Impression:  Abnormal chest CT  Acute hypoxic respiratory failure  Community-acquired pneumonia, unspecified organism  Lymphadenopathy  Lung nodules  COPD/emphysema  Acute kidney injury  Acute anemia  Generalized weakness    Plan:  Currently on 2 L nasal cannula, can continue to wean as tolerated keep SpO2  greater 90%.  Patient does not use oxygen at home  Chest CT personally reviewed.  There may be some patchy opacity concerning for pneumonia process.  Some mild lymph nodes also noted, likely reactive.  COPD/emphysematous change seen.  Will need a repeat chest CT in a few weeks to ensure resolution of his lymphadenopathy   Can continue ceftriaxone azithromycin for CAP coverage  Continue prednisone 40 mg p.o. daily for total of 5 days  Will check respiratory viral panel  Strep pneumo, Legionella negative.  Respiratory culture pending.  Will check procalcitonin  Start nebs and bronchopulmonary hygiene  Encourage I-S and flutter valve use  Encourage out of bed and mobilization as tolerated  Anemia workup per primary    VTE Prophylaxis:  Mechanical VTE prophylaxis orders are present.         Code Status and Medical Interventions:   Ordered at: 06/20/24 0109     Code Status (Patient has no pulse and is not breathing):    CPR (Attempt to Resuscitate)     Medical Interventions (Patient has pulse or is breathing):    Full Support        Labs, imaging, notes, and medications personally reviewed.  Thank you for involving me in the care of this patient.    Electronically signed by Niels Prado MD, 06/21/24, 1:42 PM EDT.

## 2024-06-21 NOTE — PROGRESS NOTES
[facility]   Hospitalist Progress Note  Date: 2024  Patient Name: Arron Valencia  : 1940  MRN: [id number]  Date of admission: 2024      Subjective   Subjective     Chief Complaint: Weakness, shortness of breath    Summary: 84 y.o. male past medical history of A-fib on sotalol, type 2 diabetes, bilateral subdural hematomas in 2015, chronic pain, COPD, hyperlipidemia, GERD who presents to the ER due to worsening weakness. Appears patient has been having progressive exertional dyspnea over the last few weeks and was evaluated in the outpatient setting 6 weeks ago for similar complaints. He has had progressive decline and worsening weakness particular over the last day which is limited his ability to get out of bed.  He was found to have hypoxemia in the ED and was admitted for further care.  Found to have multifocal pneumonia, B12 deficiency, iron deficiency.  Pulmonology was consulted.  He was started on broad-spectrum antibiotics, steroids, bronchodilators, B12 and iron supplementation.    Interval Followup: No acute events overnight.  Slept well last night, energy levels are slightly improved today.  Still gets very short winded.  Nursing tried to discontinue his oxygen but he did desaturate into the 80s on room air.  Patient is aware he likely has a basal cell carcinoma on his nose and has been referred to dermatology outpatient.    Objective   Objective     Vitals:   Temp:  [97.3 °F (36.3 °C)-98 °F (36.7 °C)] 97.3 °F (36.3 °C)  Heart Rate:  [63-79] 73  Resp:  [16-20] 18  BP: (100-113)/(57-74) 100/74  Flow (L/min):  [1-2] 2  Physical Exam    Constitutional: Elderly male, awake, alert, no acute distress, sitting up in bedside chair.  Large basal cell carcinoma on the right nare noted   Respiratory: Poor aeration, nasal cannula in place, nonlabored respirations    Cardiovascular: RRR, no MRG   Gastrointestinal: Positive bowel sounds, soft, nontender, nondistended   Neurologic: Oriented x 3,  strength symmetric in all extremities, Cranial Nerves grossly intact to confrontation, speech clear    Result Review    I have personally reviewed the results below:  [x]  Laboratory personally reviewed BMP, CBC, magnesium, blood sugars, procalcitonin  []  Microbiology  []  Radiology  []  EKG/Telemetry   []  Cardiology/Vascular   []  Pathology  []  Old records  []  Other:  CBC          6/19/2024    18:56 6/20/2024    05:11 6/21/2024    05:37   CBC   WBC 4.62  4.06  3.51    RBC 3.10  3.18  3.40    Hemoglobin 8.3  8.4  9.1    Hematocrit 26.2  26.9  29.4    MCV 84.5  84.6  86.5    MCH 26.8  26.4  26.8    MCHC 31.7  31.2  31.0    RDW 16.1  15.9  16.2    Platelets 185  174  198      CMP          6/19/2024    18:56 6/20/2024    05:11 6/21/2024    05:37   CMP   Glucose 102  92  137    BUN 39  34  30    Creatinine 1.53  1.39  1.30    EGFR 44.3  49.7  53.8    Sodium 134  136  136    Potassium 4.4  4.3  4.7    Chloride 98  101  99    Calcium 8.2  8.1  8.9    Total Protein 5.9      Albumin 3.6      Globulin 2.3      Total Bilirubin 0.2      Alkaline Phosphatase 76      AST (SGOT) 29      ALT (SGPT) 14      Albumin/Globulin Ratio 1.6      BUN/Creatinine Ratio 25.5  24.5  23.1    Anion Gap 11.4  9.8  12.6        Assessment & Plan   Assessment / Plan   Community-acquired pneumonia due to unknown bacterial source, presumed gram-negative  Hypoxemia secondary to above  Lymphadenopathy  Lung nodules  COPD with acute exacerbation  Acute on chronic normocytic anemia  B12 deficiency  Iron deficiency anemia  CKD stage IIIb  Elevated troponin likely secondary to delayed renal clearance  Paroxysmal atrial fibrillation, not on anticoagulation likely secondary to history of subdural hematoma  Hypertension  Basal cell carcinoma of nare  Hyperlipidemia  Type 2 diabetes mellitus  History of bilateral subdural hematomas in 2015  History of tobacco abuse in remission    Continue to monitor in the hospital for workup and management of the  above  Consult pulmonology due to lung nodules and lymphadenopathy seen on CT chest  Continue with Rocephin and azithromycin, infectious workup has largely been negative, but will complete 5 days of antibiotic therapy for pneumonia  Continue prednisone 40 mg daily x 5 days  Continue with scheduled DuoNebs, Pulmicort and Brovana twice daily, albuterol as needed  Discussed with pulmonology-lymph nodes are likely reactive, would benefit from manage CT chest in 4-6 weeks to monitor resolution  Check respiratory viral panel  Given B12 IM injection yesterday, start oral B12 supplementation  Transfuse IV iron, start oral iron supplementation  Wean O2 as tolerated keep sats greater 90%  Continue appropriate home medications  Patient is aware that he likely has skin cancer on his nose.  He has been referred to dermatology on outpatient basis.  Trend renal function and electrolytes with a.m. BMP, magnesium   Trend Hgb and WBC with a.m. CBC     Discussed plan with RN, pulmonology    VTE Prophylaxis:  Mechanical VTE prophylaxis orders are present.        CODE STATUS:   Code Status (Patient has no pulse and is not breathing): CPR (Attempt to Resuscitate)  Medical Interventions (Patient has pulse or is breathing): Full Support        Electronically signed by Camacho Porter MD, 06/21/24, 4:04 PM EDT.

## 2024-06-21 NOTE — PLAN OF CARE
Goal Outcome Evaluation:  Plan of Care Reviewed With: patient        Progress: no change  Outcome Evaluation: Patient presents with deficits in balance, endurance, transfers, and ambulation. Patient will benefit from skilled PT services to address these mobility deficits and decrease risk of falls.      Anticipated Discharge Disposition (PT): skilled nursing facility, inpatient rehabilitation facility

## 2024-06-22 PROBLEM — J18.9 PNEUMONIA: Status: ACTIVE | Noted: 2024-06-22

## 2024-06-22 LAB
ANION GAP SERPL CALCULATED.3IONS-SCNC: 12.3 MMOL/L (ref 5–15)
BACTERIA SPEC RESP CULT: NORMAL
BASOPHILS # BLD AUTO: 0.04 10*3/MM3 (ref 0–0.2)
BASOPHILS NFR BLD AUTO: 0.8 % (ref 0–1.5)
BUN SERPL-MCNC: 35 MG/DL (ref 8–23)
BUN/CREAT SERPL: 27.3 (ref 7–25)
CALCIUM SPEC-SCNC: 9.1 MG/DL (ref 8.6–10.5)
CHLORIDE SERPL-SCNC: 97 MMOL/L (ref 98–107)
CO2 SERPL-SCNC: 26.7 MMOL/L (ref 22–29)
CREAT SERPL-MCNC: 1.28 MG/DL (ref 0.76–1.27)
DEPRECATED RDW RBC AUTO: 49.7 FL (ref 37–54)
EGFRCR SERPLBLD CKD-EPI 2021: 54.8 ML/MIN/1.73
EOSINOPHIL # BLD AUTO: 0 10*3/MM3 (ref 0–0.4)
EOSINOPHIL NFR BLD AUTO: 0 % (ref 0.3–6.2)
ERYTHROCYTE [DISTWIDTH] IN BLOOD BY AUTOMATED COUNT: 16.2 % (ref 12.3–15.4)
GLUCOSE BLDC GLUCOMTR-MCNC: 123 MG/DL (ref 70–99)
GLUCOSE BLDC GLUCOMTR-MCNC: 210 MG/DL (ref 70–99)
GLUCOSE BLDC GLUCOMTR-MCNC: 213 MG/DL (ref 70–99)
GLUCOSE BLDC GLUCOMTR-MCNC: 223 MG/DL (ref 70–99)
GLUCOSE SERPL-MCNC: 129 MG/DL (ref 65–99)
GRAM STN SPEC: NORMAL
HCT VFR BLD AUTO: 27.9 % (ref 37.5–51)
HGB BLD-MCNC: 8.9 G/DL (ref 13–17.7)
IMM GRANULOCYTES # BLD AUTO: 0.48 10*3/MM3 (ref 0–0.05)
IMM GRANULOCYTES NFR BLD AUTO: 10 % (ref 0–0.5)
LYMPHOCYTES # BLD AUTO: 0.88 10*3/MM3 (ref 0.7–3.1)
LYMPHOCYTES NFR BLD AUTO: 18.3 % (ref 19.6–45.3)
MACROCYTES BLD QL SMEAR: NORMAL
MAGNESIUM SERPL-MCNC: 2 MG/DL (ref 1.6–2.4)
MCH RBC QN AUTO: 27.1 PG (ref 26.6–33)
MCHC RBC AUTO-ENTMCNC: 31.9 G/DL (ref 31.5–35.7)
MCV RBC AUTO: 84.8 FL (ref 79–97)
MONOCYTES # BLD AUTO: 0.42 10*3/MM3 (ref 0.1–0.9)
MONOCYTES NFR BLD AUTO: 8.7 % (ref 5–12)
NEUTROPHILS NFR BLD AUTO: 3 10*3/MM3 (ref 1.7–7)
NEUTROPHILS NFR BLD AUTO: 62.2 % (ref 42.7–76)
NRBC BLD AUTO-RTO: 0 /100 WBC (ref 0–0.2)
OVALOCYTES BLD QL SMEAR: NORMAL
PHOSPHATE SERPL-MCNC: 3.1 MG/DL (ref 2.5–4.5)
PLAT MORPH BLD: NORMAL
PLATELET # BLD AUTO: 236 10*3/MM3 (ref 140–450)
PMV BLD AUTO: 10.4 FL (ref 6–12)
POTASSIUM SERPL-SCNC: 4.3 MMOL/L (ref 3.5–5.2)
QT INTERVAL: 442 MS
QT INTERVAL: 456 MS
QTC INTERVAL: 471 MS
QTC INTERVAL: 473 MS
RBC # BLD AUTO: 3.29 10*6/MM3 (ref 4.14–5.8)
SODIUM SERPL-SCNC: 136 MMOL/L (ref 136–145)
WBC MORPH BLD: NORMAL
WBC NRBC COR # BLD AUTO: 4.82 10*3/MM3 (ref 3.4–10.8)

## 2024-06-22 PROCEDURE — 85025 COMPLETE CBC W/AUTO DIFF WBC: CPT | Performed by: INTERNAL MEDICINE

## 2024-06-22 PROCEDURE — 94799 UNLISTED PULMONARY SVC/PX: CPT

## 2024-06-22 PROCEDURE — 84100 ASSAY OF PHOSPHORUS: CPT | Performed by: INTERNAL MEDICINE

## 2024-06-22 PROCEDURE — 82948 REAGENT STRIP/BLOOD GLUCOSE: CPT

## 2024-06-22 PROCEDURE — 83735 ASSAY OF MAGNESIUM: CPT | Performed by: STUDENT IN AN ORGANIZED HEALTH CARE EDUCATION/TRAINING PROGRAM

## 2024-06-22 PROCEDURE — 80048 BASIC METABOLIC PNL TOTAL CA: CPT | Performed by: INTERNAL MEDICINE

## 2024-06-22 PROCEDURE — 82948 REAGENT STRIP/BLOOD GLUCOSE: CPT | Performed by: STUDENT IN AN ORGANIZED HEALTH CARE EDUCATION/TRAINING PROGRAM

## 2024-06-22 PROCEDURE — 94618 PULMONARY STRESS TESTING: CPT

## 2024-06-22 PROCEDURE — 99232 SBSQ HOSP IP/OBS MODERATE 35: CPT | Performed by: INTERNAL MEDICINE

## 2024-06-22 PROCEDURE — 25010000002 AZITHROMYCIN PER 500 MG: Performed by: INTERNAL MEDICINE

## 2024-06-22 PROCEDURE — 85007 BL SMEAR W/DIFF WBC COUNT: CPT | Performed by: INTERNAL MEDICINE

## 2024-06-22 PROCEDURE — 25810000003 SODIUM CHLORIDE 0.9 % SOLUTION: Performed by: INTERNAL MEDICINE

## 2024-06-22 PROCEDURE — 63710000001 PREDNISONE PER 1 MG: Performed by: INTERNAL MEDICINE

## 2024-06-22 PROCEDURE — 63710000001 INSULIN LISPRO (HUMAN) PER 5 UNITS: Performed by: STUDENT IN AN ORGANIZED HEALTH CARE EDUCATION/TRAINING PROGRAM

## 2024-06-22 PROCEDURE — 94760 N-INVAS EAR/PLS OXIMETRY 1: CPT

## 2024-06-22 RX ADMIN — IPRATROPIUM BROMIDE AND ALBUTEROL SULFATE 3 ML: .5; 3 SOLUTION RESPIRATORY (INHALATION) at 23:46

## 2024-06-22 RX ADMIN — ASPIRIN 81 MG: 81 TABLET, COATED ORAL at 08:59

## 2024-06-22 RX ADMIN — IPRATROPIUM BROMIDE AND ALBUTEROL SULFATE 3 ML: .5; 3 SOLUTION RESPIRATORY (INHALATION) at 14:08

## 2024-06-22 RX ADMIN — GABAPENTIN 300 MG: 300 CAPSULE ORAL at 15:23

## 2024-06-22 RX ADMIN — FERROUS SULFATE TAB 325 MG (65 MG ELEMENTAL FE) 325 MG: 325 (65 FE) TAB at 08:59

## 2024-06-22 RX ADMIN — Medication 10 ML: at 21:09

## 2024-06-22 RX ADMIN — IPRATROPIUM BROMIDE AND ALBUTEROL SULFATE 3 ML: .5; 3 SOLUTION RESPIRATORY (INHALATION) at 06:45

## 2024-06-22 RX ADMIN — ARFORMOTEROL TARTRATE 15 MCG: 15 SOLUTION RESPIRATORY (INHALATION) at 06:45

## 2024-06-22 RX ADMIN — SOTALOL HYDROCHLORIDE 40 MG: 80 TABLET ORAL at 21:10

## 2024-06-22 RX ADMIN — CYANOCOBALAMIN TAB 500 MCG 500 MCG: 500 TAB at 08:59

## 2024-06-22 RX ADMIN — INSULIN LISPRO 3 UNITS: 100 INJECTION, SOLUTION INTRAVENOUS; SUBCUTANEOUS at 21:09

## 2024-06-22 RX ADMIN — SENNOSIDES AND DOCUSATE SODIUM 2 TABLET: 50; 8.6 TABLET ORAL at 21:14

## 2024-06-22 RX ADMIN — AZITHROMYCIN MONOHYDRATE 500 MG: 500 INJECTION, POWDER, LYOPHILIZED, FOR SOLUTION INTRAVENOUS at 15:23

## 2024-06-22 RX ADMIN — INSULIN LISPRO 3 UNITS: 100 INJECTION, SOLUTION INTRAVENOUS; SUBCUTANEOUS at 17:33

## 2024-06-22 RX ADMIN — BUDESONIDE 0.5 MG: 0.5 INHALANT ORAL at 19:19

## 2024-06-22 RX ADMIN — GABAPENTIN 300 MG: 300 CAPSULE ORAL at 05:21

## 2024-06-22 RX ADMIN — BUDESONIDE 0.5 MG: 0.5 INHALANT ORAL at 06:45

## 2024-06-22 RX ADMIN — GABAPENTIN 300 MG: 300 CAPSULE ORAL at 21:10

## 2024-06-22 RX ADMIN — INSULIN LISPRO 3 UNITS: 100 INJECTION, SOLUTION INTRAVENOUS; SUBCUTANEOUS at 12:26

## 2024-06-22 RX ADMIN — SOTALOL HYDROCHLORIDE 40 MG: 80 TABLET ORAL at 08:59

## 2024-06-22 RX ADMIN — Medication 10 ML: at 09:00

## 2024-06-22 RX ADMIN — ARFORMOTEROL TARTRATE 15 MCG: 15 SOLUTION RESPIRATORY (INHALATION) at 19:19

## 2024-06-22 RX ADMIN — IPRATROPIUM BROMIDE AND ALBUTEROL SULFATE 3 ML: .5; 3 SOLUTION RESPIRATORY (INHALATION) at 19:19

## 2024-06-22 RX ADMIN — PRAVASTATIN SODIUM 40 MG: 20 TABLET ORAL at 21:10

## 2024-06-22 RX ADMIN — PREDNISONE 40 MG: 20 TABLET ORAL at 08:59

## 2024-06-22 NOTE — PLAN OF CARE
Goal Outcome Evaluation:  Plan of Care Reviewed With: patient           Outcome Evaluation: Pt is alert and orient x4.  VS WNl for pt.  Pt denies any pain/discomfort.  Insulin adm as ordered

## 2024-06-22 NOTE — PLAN OF CARE
Goal Outcome Evaluation:  Patient alert and oriented x4, able to make needs known.  Patient with no acute events thus far this shift.  Patient with no needs or concerns voiced at this time.  Call light within reach.  Continue plan of care.

## 2024-06-22 NOTE — PROGRESS NOTES
[facility]   Hospitalist Progress Note  Date: 2024  Patient Name: Arron Valencia  : 1940  MRN: [id number]  Date of admission: 2024      Subjective   Subjective     Chief Complaint: Weakness, shortness of breath    Summary: 84 y.o. male past medical history of A-fib on sotalol, type 2 diabetes, bilateral subdural hematomas in 2015, chronic pain, COPD, hyperlipidemia, GERD who presents to the ER due to worsening weakness. Appears patient has been having progressive exertional dyspnea over the last few weeks and was evaluated in the outpatient setting 6 weeks ago for similar complaints. He has had progressive decline and worsening weakness particular over the last day which is limited his ability to get out of bed.  He was found to have hypoxemia in the ED and was admitted for further care.  Found to have multifocal pneumonia, B12 deficiency, iron deficiency.  Pulmonology was consulted.  He was started on broad-spectrum antibiotics, steroids, bronchodilators, B12 and iron supplementation.    Interval Followup: No acute events overnight.  States he continues to feel better every day.  Is not requiring oxygen at rest but still needs it with exertion.  Still has intermittent cough.  Wanting to go home soon.    Objective   Objective     Vitals:   Temp:  [96.8 °F (36 °C)-98 °F (36.7 °C)] 96.8 °F (36 °C)  Heart Rate:  [62-77] 77  Resp:  [16-18] 18  BP: ()/(60-82) 122/82  Flow (L/min):  [0-2] 2  Physical Exam    Constitutional: Elderly male, awake, alert, no acute distress. Large basal cell carcinoma on the right nare noted   Respiratory: Improved aeration, nasal cannula in place, nonlabored respirations    Cardiovascular: RRR, no MRG   Gastrointestinal: Positive bowel sounds, soft, nontender, nondistended   Neurologic: Oriented x 3, strength symmetric in all extremities, Cranial Nerves grossly intact to confrontation, speech clear    Result Review    I have personally reviewed the results  below:  [x]  Laboratory personally reviewed BMP, CBC, magnesium, phosphorus, blood sugars  []  Microbiology  []  Radiology  []  EKG/Telemetry   []  Cardiology/Vascular   []  Pathology  []  Old records  []  Other:  CBC          6/20/2024    05:11 6/21/2024    05:37 6/22/2024    05:54   CBC   WBC 4.06  3.51  4.82    RBC 3.18  3.40  3.29    Hemoglobin 8.4  9.1  8.9    Hematocrit 26.9  29.4  27.9    MCV 84.6  86.5  84.8    MCH 26.4  26.8  27.1    MCHC 31.2  31.0  31.9    RDW 15.9  16.2  16.2    Platelets 174  198  236      CMP          6/20/2024    05:11 6/21/2024    05:37 6/22/2024    05:54   CMP   Glucose 92  137  129    BUN 34  30  35    Creatinine 1.39  1.30  1.28    EGFR 49.7  53.8  54.8    Sodium 136  136  136    Potassium 4.3  4.7  4.3    Chloride 101  99  97    Calcium 8.1  8.9  9.1    BUN/Creatinine Ratio 24.5  23.1  27.3    Anion Gap 9.8  12.6  12.3        Assessment & Plan   Assessment / Plan   Community-acquired pneumonia due to unknown bacterial source, presumed gram-negative  Hypoxemia secondary to above  Lymphadenopathy  Lung nodules  COPD with acute exacerbation  Acute on chronic normocytic anemia  B12 deficiency  Iron deficiency anemia  CKD stage IIIb  Elevated troponin likely secondary to delayed renal clearance  Paroxysmal atrial fibrillation, not on anticoagulation likely secondary to history of subdural hematoma  Hypertension  Basal cell carcinoma of nare  Hyperlipidemia  Type 2 diabetes mellitus  History of bilateral subdural hematomas in 2015  History of tobacco abuse in remission    Continue to monitor in the hospital for workup and management of the above  Discussed with pulmonology-CT chest findings consistent with infection likely reactive lymphadenopathy  Will benefit from repeat CT chest in 8 weeks  Discontinue Rocephin, completed course of azithromycin  Continue prednisone 40 mg daily x 5 days  Continue with scheduled DuoNebs, Pulmicort and Brovana twice daily, albuterol as  needed  Respiratory viral panel negative  Continue oral B12 supplementation  Continue oral iron supplementation  Patient will need GI referral at discharge for consideration of colonoscopy in setting of iron deficiency anemia  Continue appropriate home medications  Patient is aware that he likely has skin cancer on his nose.  He has been referred to dermatology on outpatient basis.  Trend renal function and electrolytes with a.m. BMP, magnesium   Trend Hgb and WBC with a.m. CBC     Discussed plan with RN, pulmonology    VTE Prophylaxis:  Mechanical VTE prophylaxis orders are present.      CODE STATUS:   Code Status (Patient has no pulse and is not breathing): CPR (Attempt to Resuscitate)  Medical Interventions (Patient has pulse or is breathing): Full Support

## 2024-06-22 NOTE — PROGRESS NOTES
Pulmonary / Critical Care Progress Note      Patient Name: Arron Valencia  : 1940  MRN: [id number]  Attending:  Camacho Dumont MD  Date of admission: 2024    Subjective   Subjective   Follow-up for respiratory failure    Over past 24 hours:  On 2 L of oxygen  Echocardiogram with diastolic dysfunction  All respiratory culture negative  Dyspnea improved  Cough with thin clear secretions noted  No wheezing  No chest pain or hemoptysis  No nausea, fevers or chills        Objective   Objective     Vitals:   Temp:  [97.3 °F (36.3 °C)-98 °F (36.7 °C)] 97.9 °F (36.6 °C)  Heart Rate:  [62-73] 62  Resp:  [16-18] 16  BP: ()/(57-74) 92/60  Flow (L/min):  [1-2] 2    Physical Exam   Vital Signs Reviewed   General:  WDWN, Alert, NAD.    HEENT:  PERRL, EOMI.  OP, nares clear  Chest:  good aeration, clear to auscultation bilaterally, tympanic to percussion bilaterally, no work of breathing noted  CV: RRR, no MGR, pulses 2+, equal.  Abd:  Soft, NT, ND, + BS, no HSM  EXT:  no clubbing, no cyanosis, no edema  Neuro:  A&Ox3, CN grossly intact, no focal deficits.  Skin: No rashes or lesions noted      Result Review    Result Review:  I have personally reviewed the results from the time of this admission to 2024 06:31 EDT and agree with these findings:  [x]  Laboratory  [x]  Microbiology  [x]  Radiology  [x]  EKG/Telemetry   [x]  Cardiology/Vascular   []  Pathology  []  Old records  []  Other:  Most notable findings include:       Lab 24  0554 24  0537 24  0511 24  1856   WBC 4.82 3.51 4.06 4.62   HEMOGLOBIN 8.9* 9.1* 8.4* 8.3*   HEMATOCRIT 27.9* 29.4* 26.9* 26.2*   PLATELETS 236 198 174 185   SODIUM 136 136 136 134*   POTASSIUM 4.3 4.7 4.3 4.4   CHLORIDE 97* 99 101 98   CO2 26.7 24.4 25.2 24.6   BUN 35* 30* 34* 39*   CREATININE 1.28* 1.30* 1.39* 1.53*   GLUCOSE 129* 137* 92 102*   CALCIUM 9.1 8.9 8.1* 8.2*   PHOSPHORUS 3.1  --   --   --    TOTAL PROTEIN  --   --   --  5.9*   ALBUMIN   --   --   --  3.6   GLOBULIN  --   --   --  2.3     CT Chest With Contrast Diagnostic    Result Date: 6/20/2024  CT CHEST W CONTRAST DIAGNOSTIC Date of Exam: 6/20/2024 9:25 AM EDT Indication: soa. Shortness of air. Comparison: None available. Technique: Axial CT images were obtained of the chest after the uneventful intravenous administration of iodinated contrast.  Reconstructed coronal and sagittal images were also obtained. Automated exposure control and iterative construction methods were  used. Findings: There is mild to moderate upper lung predominant pulmonary emphysematous change. There is biapical scarring. There are patchy groundglass and reticular nodular and nodular densities in a peribronchial distribution in the left upper lobe suggesting an infectious or inflammatory process. Largest single nodule measures 6 mm on image #131 of series 5. There is associated patchy opacification of bronchial segments within these areas suggesting mucous plugging. There is a 3 mm subpleural nodule in the left lower lobe on image #141 of series 5. There is 7 mm subpleural nodule in the left lower lobe on image #239 of series 5. Scattered calcified granulomatous changes are noted. No pleural fluid identified. Pulmonary arteries appear normal caliber. No pulmonary arterial filling defects are evident to suggest PE. Thoracic aorta appears normal caliber. Multivessel scattered coronary calcification is noted. Aortic vascular calcification is noted. Mildly prominent precarinal lymph node measuring 1.2 cm in short axis dimension and prevascular space lymph node measuring 1.4 cm in short axis dimension may be reactive given the pulmonary findings. No axillary or supraclavicular adenopathy identified. Thyroid  is unremarkable. Limited imaging through the upper abdomen demonstrates grossly normal adrenal morphology. Indeterminate low-density lesions in the left renal fossa may be due to cysts but are incompletely characterized.  Visualized adrenals have a grossly normal morphology. There is degenerative change in the spine. No aggressive osseous lesions are identified.     Impression: Impression: 1. Patchy groundglass, reticular nodular and nodular densities in a peribronchial distribution in the left upper lobe suggesting an infectious or inflammatory process. Correlate clinically. Short-term follow-up CT may be of value, if clinically warranted. 2. Additional more discrete indeterminate nodules in the left upper lobe and left lower lobe, measuring up to 7 mm. 6-month follow-up CT recommended per Fleischner guidelines. 3. Mildly prominent precarinal and left prevascular space lymph nodes may be reactive given the pulmonary findings. Attention on follow-up recommended. 4. Pulmonary emphysematous changes and scarring. Emphysema on CT is an independent risk factor for lung cancer. Low dose lung cancer screening should be considered if patient qualifies based on smoking history or if not already enrolled in a screening program. Electronically Signed: Ross Rust MD  6/20/2024 9:54 AM EDT  Workstation ID: [id number]     Results for orders placed during the hospital encounter of 06/19/24    Adult Transthoracic Echo Complete W/ Cont if Necessary Per Protocol    Interpretation Summary    Left ventricular systolic function is low normal. Calculated left ventricular EF = 45.3%    Left ventricular wall thickness is consistent with mild concentric hypertrophy.    Left ventricular diastolic function is consistent with (grade I) impaired relaxation and age.    The right ventricular cavity is mildly dilated with mildly reduced systolic function.    Left atrial volume is moderate to severely increased.    Mild tricuspid valve regurgitation.    Estimated right ventricular systolic pressure from tricuspid regurgitation is markedly elevated (>55 mmHg).    Culture so far negative    Assessment & Plan   Assessment / Plan     Active Hospital Problems:  Active  Hospital Problems    Diagnosis     **Generalized weakness      Impression:  Abnormal chest CT  Acute hypoxic respiratory failure  Community-acquired pneumonia, unspecified organism  Mediastinal adenopathy, suspect reactive  Multiple lung nodules, suspect inflammatory nodules  COPD/emphysema with acute exacerbation  Acute kidney injury secondary to prerenal etiology  Acute anemia  Generalized weakness     Plan:  Wean O2 to keep SPO2 greater than 90%.  Patient does not use oxygen at home  Suspect chest CT findings consistent with infection/inflammation.  Likely has patchy infiltrate with reactive lymphadenopathy.  Repeat in 8 weeks as an outpatient to ensure resolution  Procalcitonin all cultures negative.  Discontinue ceftriaxone and complete course of azithromycin  Finish 5 days of prednisone 40 mg daily  Respiratory viral panel and all cultures negative  Procalcitonin negative  Continue nebulizers and aggressive bronchopulmonary hygiene  Encourage activity and incentive spirometer use  Echocardiogram with diastolic dysfunction.  Appears euvolemic at this time.    VTE Prophylaxis:  Mechanical VTE prophylaxis orders are present.    CODE STATUS:   Code Status (Patient has no pulse and is not breathing): CPR (Attempt to Resuscitate)  Medical Interventions (Patient has pulse or is breathing): Full Support      Labs, imaging, microbiology, notes and medications personally reviewed  Discussed with primary    Electronically signed by Kun Song MD, 06/22/24, 10:35 AM EDT.

## 2024-06-22 NOTE — PROGRESS NOTES
Walking Oximetry Progress Note      Patient Name:  Arron Valencia  YOB: 1940  Date of Procedure: 06/22/24              ROOM AIR BASELINE   SpO2% 89   Heart Rate 108     EXERCISE ON ROOM AIR SpO2% EXERCISE ON O2 LPM SpO2%   1 MINUTE 88 1 MINUTE 0    2 MINUTES 88 2 MINUTES 0    3 MINUTES 88 3 MINUTES 0    4 MINUTES 89 4 MINUTES 0    5 MINUTES 88 5 MINUTES 0    6 MINUTES 84 6 MINUTES 2               Time to Recovery  3 minutes   SpO2% Post Exercise  94 on 2.    HR Post Exercise  89     Comments: pt could benefit from home O2          Electronically signed by Di Carrizales, RRT, 06/22/24, 11:32 AM EDT.

## 2024-06-23 VITALS
TEMPERATURE: 98.2 F | WEIGHT: 151.46 LBS | HEART RATE: 78 BPM | BODY MASS INDEX: 18.83 KG/M2 | OXYGEN SATURATION: 97 % | SYSTOLIC BLOOD PRESSURE: 92 MMHG | HEIGHT: 75 IN | RESPIRATION RATE: 16 BRPM | DIASTOLIC BLOOD PRESSURE: 58 MMHG

## 2024-06-23 LAB
ANION GAP SERPL CALCULATED.3IONS-SCNC: 10.6 MMOL/L (ref 5–15)
BACTERIA UR QL AUTO: NORMAL /HPF
BASOPHILS # BLD AUTO: 0.05 10*3/MM3 (ref 0–0.2)
BASOPHILS NFR BLD AUTO: 0.9 % (ref 0–1.5)
BILIRUB UR QL STRIP: NEGATIVE
BUN SERPL-MCNC: 35 MG/DL (ref 8–23)
BUN/CREAT SERPL: 27.8 (ref 7–25)
CALCIUM SPEC-SCNC: 9.6 MG/DL (ref 8.6–10.5)
CHLORIDE SERPL-SCNC: 101 MMOL/L (ref 98–107)
CLARITY UR: CLEAR
CO2 SERPL-SCNC: 25.4 MMOL/L (ref 22–29)
COLOR UR: YELLOW
CREAT SERPL-MCNC: 1.26 MG/DL (ref 0.76–1.27)
DEPRECATED RDW RBC AUTO: 49 FL (ref 37–54)
EGFRCR SERPLBLD CKD-EPI 2021: 55.9 ML/MIN/1.73
EOSINOPHIL # BLD AUTO: 0 10*3/MM3 (ref 0–0.4)
EOSINOPHIL NFR BLD AUTO: 0 % (ref 0.3–6.2)
ERYTHROCYTE [DISTWIDTH] IN BLOOD BY AUTOMATED COUNT: 16.1 % (ref 12.3–15.4)
GLUCOSE BLDC GLUCOMTR-MCNC: 140 MG/DL (ref 70–99)
GLUCOSE BLDC GLUCOMTR-MCNC: 158 MG/DL (ref 70–99)
GLUCOSE SERPL-MCNC: 144 MG/DL (ref 65–99)
GLUCOSE UR STRIP-MCNC: NEGATIVE MG/DL
HCT VFR BLD AUTO: 27.5 % (ref 37.5–51)
HGB BLD-MCNC: 8.7 G/DL (ref 13–17.7)
HGB UR QL STRIP.AUTO: NEGATIVE
HYALINE CASTS UR QL AUTO: NORMAL /LPF
IMM GRANULOCYTES # BLD AUTO: 0.57 10*3/MM3 (ref 0–0.05)
IMM GRANULOCYTES NFR BLD AUTO: 9.7 % (ref 0–0.5)
KETONES UR QL STRIP: NEGATIVE
LEUKOCYTE ESTERASE UR QL STRIP.AUTO: NEGATIVE
LYMPHOCYTES # BLD AUTO: 0.97 10*3/MM3 (ref 0.7–3.1)
LYMPHOCYTES NFR BLD AUTO: 16.6 % (ref 19.6–45.3)
MAGNESIUM SERPL-MCNC: 2 MG/DL (ref 1.6–2.4)
MCH RBC QN AUTO: 26.6 PG (ref 26.6–33)
MCHC RBC AUTO-ENTMCNC: 31.6 G/DL (ref 31.5–35.7)
MCV RBC AUTO: 84.1 FL (ref 79–97)
MONOCYTES # BLD AUTO: 0.5 10*3/MM3 (ref 0.1–0.9)
MONOCYTES NFR BLD AUTO: 8.5 % (ref 5–12)
NEUTROPHILS NFR BLD AUTO: 3.77 10*3/MM3 (ref 1.7–7)
NEUTROPHILS NFR BLD AUTO: 64.3 % (ref 42.7–76)
NITRITE UR QL STRIP: NEGATIVE
NRBC BLD AUTO-RTO: 0.3 /100 WBC (ref 0–0.2)
PH UR STRIP.AUTO: 5.5 [PH] (ref 5–8)
PHOSPHATE SERPL-MCNC: 3.1 MG/DL (ref 2.5–4.5)
PLATELET # BLD AUTO: 253 10*3/MM3 (ref 140–450)
PMV BLD AUTO: 9.8 FL (ref 6–12)
POTASSIUM SERPL-SCNC: 4.4 MMOL/L (ref 3.5–5.2)
PROT UR QL STRIP: ABNORMAL
RBC # BLD AUTO: 3.27 10*6/MM3 (ref 4.14–5.8)
RBC # UR STRIP: NORMAL /HPF
REF LAB TEST METHOD: NORMAL
SODIUM SERPL-SCNC: 137 MMOL/L (ref 136–145)
SP GR UR STRIP: 1.01 (ref 1–1.03)
SQUAMOUS #/AREA URNS HPF: NORMAL /HPF
UROBILINOGEN UR QL STRIP: ABNORMAL
WBC # UR STRIP: NORMAL /HPF
WBC NRBC COR # BLD AUTO: 5.86 10*3/MM3 (ref 3.4–10.8)

## 2024-06-23 PROCEDURE — 85025 COMPLETE CBC W/AUTO DIFF WBC: CPT | Performed by: INTERNAL MEDICINE

## 2024-06-23 PROCEDURE — 80048 BASIC METABOLIC PNL TOTAL CA: CPT | Performed by: INTERNAL MEDICINE

## 2024-06-23 PROCEDURE — 94799 UNLISTED PULMONARY SVC/PX: CPT

## 2024-06-23 PROCEDURE — 82948 REAGENT STRIP/BLOOD GLUCOSE: CPT | Performed by: STUDENT IN AN ORGANIZED HEALTH CARE EDUCATION/TRAINING PROGRAM

## 2024-06-23 PROCEDURE — 99239 HOSP IP/OBS DSCHRG MGMT >30: CPT | Performed by: INTERNAL MEDICINE

## 2024-06-23 PROCEDURE — 99232 SBSQ HOSP IP/OBS MODERATE 35: CPT | Performed by: INTERNAL MEDICINE

## 2024-06-23 PROCEDURE — 83735 ASSAY OF MAGNESIUM: CPT | Performed by: INTERNAL MEDICINE

## 2024-06-23 PROCEDURE — 63710000001 PREDNISONE PER 1 MG: Performed by: INTERNAL MEDICINE

## 2024-06-23 PROCEDURE — 84100 ASSAY OF PHOSPHORUS: CPT | Performed by: INTERNAL MEDICINE

## 2024-06-23 PROCEDURE — 81001 URINALYSIS AUTO W/SCOPE: CPT | Performed by: INTERNAL MEDICINE

## 2024-06-23 RX ORDER — ALBUTEROL SULFATE 90 UG/1
2 AEROSOL, METERED RESPIRATORY (INHALATION) EVERY 4 HOURS PRN
Qty: 1 G | Refills: 0 | Status: SHIPPED | OUTPATIENT
Start: 2024-06-23

## 2024-06-23 RX ORDER — PREDNISONE 20 MG/1
40 TABLET ORAL DAILY
Qty: 6 TABLET | Refills: 0 | Status: SHIPPED | OUTPATIENT
Start: 2024-06-23 | End: 2024-06-26

## 2024-06-23 RX ORDER — FERROUS SULFATE 325(65) MG
325 TABLET ORAL
Qty: 30 TABLET | Refills: 0 | Status: SHIPPED | OUTPATIENT
Start: 2024-06-24

## 2024-06-23 RX ADMIN — GABAPENTIN 300 MG: 300 CAPSULE ORAL at 08:33

## 2024-06-23 RX ADMIN — PREDNISONE 40 MG: 20 TABLET ORAL at 08:33

## 2024-06-23 RX ADMIN — FERROUS SULFATE TAB 325 MG (65 MG ELEMENTAL FE) 325 MG: 325 (65 FE) TAB at 08:33

## 2024-06-23 RX ADMIN — ARFORMOTEROL TARTRATE 15 MCG: 15 SOLUTION RESPIRATORY (INHALATION) at 07:00

## 2024-06-23 RX ADMIN — IPRATROPIUM BROMIDE AND ALBUTEROL SULFATE 3 ML: .5; 3 SOLUTION RESPIRATORY (INHALATION) at 07:00

## 2024-06-23 RX ADMIN — Medication 10 ML: at 08:34

## 2024-06-23 RX ADMIN — ASPIRIN 81 MG: 81 TABLET, COATED ORAL at 08:33

## 2024-06-23 RX ADMIN — CYANOCOBALAMIN TAB 500 MCG 500 MCG: 500 TAB at 08:33

## 2024-06-23 RX ADMIN — SOTALOL HYDROCHLORIDE 40 MG: 80 TABLET ORAL at 08:33

## 2024-06-23 RX ADMIN — BUDESONIDE 0.5 MG: 0.5 INHALANT ORAL at 07:00

## 2024-06-23 NOTE — CASE MANAGEMENT/SOCIAL WORK
OBS:  6/20/24  IP:  6/22/24 (Met IP criteria on 6/21)  DC:  6/23/24  LOS:  < 2MN  Exception: N/A, [id number]

## 2024-06-23 NOTE — DISCHARGE SUMMARY
[facility]         HOSPITALIST  DISCHARGE SUMMARY    Patient Name: Arron Valencia  : 1940  MRN: [id number]    Date of Admission: 2024  Date of Discharge: 2024  Primary Care Physician: Shanti Gutierres APRN    Consults       Date and Time Order Name Status Description    2024  8:39 AM Inpatient Pulmonology Consult Completed     2024 11:54 PM Hospitalist (on-call MD unless specified)              Active and Resolved Hospital Problems:  Active Hospital Problems    Diagnosis POA    **Generalized weakness [R53.1] Yes    Pneumonia [J18.9] Yes      Resolved Hospital Problems   No resolved problems to display.   Community-acquired pneumonia due to unknown bacterial source, presumed gram-negative  Hypoxemia secondary to above  Lymphadenopathy  Lung nodules  COPD with acute exacerbation  Acute on chronic normocytic anemia  B12 deficiency  Iron deficiency anemia  CKD stage IIIb  Elevated troponin likely secondary to delayed renal clearance  Paroxysmal atrial fibrillation, not on anticoagulation likely secondary to history of subdural hematoma  Hypertension  Basal cell carcinoma of nare  Hyperlipidemia  Type 2 diabetes mellitus  History of bilateral subdural hematomas in   History of tobacco abuse in remission    Hospital Course     Hospital Course:  Arron Valencia is a 84 y.o. male past medical history of A-fib on sotalol, type 2 diabetes, bilateral subdural hematomas in 2015, chronic pain, COPD, hyperlipidemia, GERD who presents to the ER due to worsening weakness. Appears patient has been having progressive exertional dyspnea over the last few weeks and was evaluated in the outpatient setting 6 weeks ago for similar complaints. He has had progressive decline and worsening weakness particular over the last day which is limited his ability to get out of bed. He was found to have hypoxemia in the ED and was admitted for further care. Found to have multifocal pneumonia,  "B12 deficiency, iron deficiency. Pulmonology was consulted. He was started on broad-spectrum antibiotics, steroids, bronchodilators, B12 and iron supplementation with significant improvement.  He completed a course of antibiotics during his hospital stay.  He did fail a walk test prior to discharge and was set up for home oxygen.  PT/OT consulted and did recommend subacute care but patient declined.  He and his daughter are both aware that he is weak and high risk for falls but preferred to take him home with home health.  He was discharged home with home health in stable condition on 6/23/2024 to complete a course of steroids.  He will continue on iron and B12 supplementation.  Recommend follow-up with gastroenterology within 1 month for consideration of colonoscopy in setting of iron deficiency anemia.  Recommend follow-up with PCP within 1 week, recommend follow-up with pulmonology within 1 month.  Recommend he follow-up with dermatology for likely basal cell carcinoma on his nose.  Patient states he will \"take this to the grave with him,\" implying that he does not want treatment for his cancer.  Did discuss CODE STATUS, he wants to remain full code full treatment.    DISCHARGE Follow Up Recommendations for labs and diagnostics: CT chest within 4 to 6 weeks to monitor resolution of lymphadenopathy      Day of Discharge     Vital Signs:  Temp:  [96.8 °F (36 °C)-97.8 °F (36.6 °C)] 97.8 °F (36.6 °C)  Heart Rate:  [59-78] 78  Resp:  [16-18] 18  BP: ()/(54-82) 90/54  Flow (L/min):  [2] 2  Physical Exam:         Constitutional: Elderly male, awake, alert, no acute distress. Large basal cell carcinoma on the right nare noted              Respiratory: Improved aeration, nasal cannula in place, nonlabored respirations               Cardiovascular: RRR, no MRG              Gastrointestinal: Positive bowel sounds, soft, nontender, nondistended              Neurologic: Oriented x 3, strength symmetric in all " extremities, Cranial Nerves grossly intact to confrontation, speech clear      Discharge Details        Discharge Medications        New Medications        Instructions Start Date   albuterol sulfate  (90 Base) MCG/ACT inhaler  Commonly known as: PROVENTIL HFA;VENTOLIN HFA;PROAIR HFA   2 puffs, Inhalation, Every 4 Hours PRN      cyanocobalamin 500 MCG tablet  Commonly known as: VITAMIN B-12   500 mcg, Oral, Daily   Start Date: June 24, 2024     ferrous sulfate 325 (65 FE) MG tablet   325 mg, Oral, Daily With Breakfast   Start Date: June 24, 2024     predniSONE 20 MG tablet  Commonly known as: DELTASONE   40 mg, Oral, Daily             Continue These Medications        Instructions Start Date   aspirin 81 MG EC tablet   81 mg, Oral, Daily      Breztri Aerosphere 160-9-4.8 MCG/ACT aerosol inhaler  Generic drug: Budeson-Glycopyrrol-Formoterol   2 puffs, Inhalation, 2 Times Daily      gabapentin 600 MG tablet  Commonly known as: NEURONTIN   600 mg, Oral, 3 Times Daily      HYDROcodone-acetaminophen 7.5-325 MG per tablet  Commonly known as: NORCO   1 tablet, Oral, 2 Times Daily PRN      metFORMIN 1000 MG tablet  Commonly known as: GLUCOPHAGE   1,000 mg, Oral, 2 Times Daily With Meals      omeprazole 20 MG capsule  Commonly known as: priLOSEC   20 mg, Oral, Daily      pravastatin 40 MG tablet  Commonly known as: PRAVACHOL   40 mg, Oral, Nightly      sotalol 80 MG tablet  Commonly known as: BETAPACE   40 mg, Oral, 2 Times Daily               No Known Allergies    Discharge Disposition:  [place] [facility] [place]    Diet:  Hospital:  Diet Order   Procedures    Diet: Cardiac; Healthy Heart (2-3 Na+); Fluid Consistency: Thin (IDDSI 0)       Discharge Activity:   Activity Instructions       Activity as Tolerated              CODE STATUS:  Code Status and Medical Interventions:   Ordered at: 06/20/24 0109     Code Status (Patient has no pulse and is not breathing):    CPR (Attempt to Resuscitate)     Medical Interventions  (Patient has pulse or is breathing):    Full Support         No future appointments.    Additional Instructions for the Follow-ups that You Need to Schedule       Discharge Follow-up with PCP   As directed       Currently Documented PCP:    Shanti Gutierres APRN    PCP Phone Number:    [phone]     Follow Up Details: 3-5 days                Pertinent  and/or Most Recent Results     PROCEDURES:   None    LAB RESULTS:      Lab 06/23/24  0557 06/22/24  0554 06/21/24  0537 06/20/24  0511 06/19/24  1856   WBC 5.86 4.82 3.51 4.06 4.62   HEMOGLOBIN 8.7* 8.9* 9.1* 8.4* 8.3*   HEMATOCRIT 27.5* 27.9* 29.4* 26.9* 26.2*   PLATELETS 253 236 198 174 185   NEUTROS ABS 3.77 3.00 1.95 2.71 3.42   IMMATURE GRANS (ABS) 0.57* 0.48* 0.44* 0.31*  --    LYMPHS ABS 0.97 0.88 0.67* 0.57*  --    MONOS ABS 0.50 0.42 0.41 0.44  --    EOS ABS 0.00 0.00 0.00 0.00  --    MCV 84.1 84.8 86.5 84.6 84.5   PROCALCITONIN  --   --  0.08  --  0.11         Lab 06/23/24  0557 06/22/24  0554 06/21/24  0537 06/20/24  0511 06/19/24  1856   SODIUM 137 136 136 136 134*   POTASSIUM 4.4 4.3 4.7 4.3 4.4   CHLORIDE 101 97* 99 101 98   CO2 25.4 26.7 24.4 25.2 24.6   ANION GAP 10.6 12.3 12.6 9.8 11.4   BUN 35* 35* 30* 34* 39*   CREATININE 1.26 1.28* 1.30* 1.39* 1.53*   EGFR 55.9* 54.8* 53.8* 49.7* 44.3*   GLUCOSE 144* 129* 137* 92 102*   CALCIUM 9.6 9.1 8.9 8.1* 8.2*   MAGNESIUM 2.0 2.0 2.1 1.7 2.0   PHOSPHORUS 3.1 3.1  --   --   --    TSH  --   --   --   --  0.747         Lab 06/19/24 1856   TOTAL PROTEIN 5.9*   ALBUMIN 3.6   GLOBULIN 2.3   ALT (SGPT) 14   AST (SGOT) 29   BILIRUBIN 0.2   ALK PHOS 76         Lab 06/20/24  0713 06/20/24  0511 06/19/24 1856   PROBNP  --   --  334.1   HSTROP T 53* 47* 46*             Lab 06/19/24 1856   IRON 28*   IRON SATURATION (TSAT) 7*   TIBC 395   TRANSFERRIN 265   FERRITIN 88.71   FOLATE >20.00   VITAMIN B 12 <150*         Brief Urine Lab Results  (Last result in the past 365 days)        Color   Clarity   Blood   Leuk  Est   Nitrite   Protein   CREAT   Urine HCG        06/23/24 0724 Yellow   Clear   Negative   Negative   Negative   30 mg/dL (1+)                 Microbiology Results (last 10 days)       Procedure Component Value - Date/Time    Respiratory Panel PCR w/COVID-19(SARS-CoV-2) CEFERINO/JES/NYLA/PAD/COR/NEGRITA In-House, NP Swab in UTM/VTM, 2 HR TAT - Swab, Nasopharynx [[id number]]  (Normal) Collected: 06/21/24 1543    Lab Status: Final result Specimen: Swab from Nasopharynx Updated: 06/21/24 1748     ADENOVIRUS, PCR Not Detected     Coronavirus 229E Not Detected     Coronavirus HKU1 Not Detected     Coronavirus NL63 Not Detected     Coronavirus OC43 Not Detected     COVID19 Not Detected     Human Metapneumovirus Not Detected     Human Rhinovirus/Enterovirus Not Detected     Influenza A PCR Not Detected     Influenza B PCR Not Detected     Parainfluenza Virus 1 Not Detected     Parainfluenza Virus 2 Not Detected     Parainfluenza Virus 3 Not Detected     Parainfluenza Virus 4 Not Detected     RSV, PCR Not Detected     Bordetella pertussis pcr Not Detected     Bordetella parapertussis PCR Not Detected     Chlamydophila pneumoniae PCR Not Detected     Mycoplasma pneumo by PCR Not Detected    Narrative:      In the setting of a positive respiratory panel with a viral infection PLUS a negative procalcitonin without other underlying concern for bacterial infection, consider observing off antibiotics or discontinuation of antibiotics and continue supportive care. If the respiratory panel is positive for atypical bacterial infection (Bordetella pertussis, Chlamydophila pneumoniae, or Mycoplasma pneumoniae), consider antibiotic de-escalation to target atypical bacterial infection.    Respiratory Culture - Sputum, Cough [[id number]] Collected: 06/20/24 1452    Lab Status: Final result Specimen: Sputum from Cough Updated: 06/22/24 1138     Respiratory Culture Moderate growth (3+) Normal respiratory andrea. No S. aureus or Pseudomonas aeruginosa  detected. Final report.     Gram Stain Moderate (3+) WBCs seen      Moderate (3+) Mucous strands      Rare (1+) Epithelial cells per low power field      Few (2+) Gram negative bacilli      Occasional Gram positive cocci in pairs    Blood Culture - Blood, Arm, Right [[id number]]  (Normal) Collected: 06/20/24 1434    Lab Status: Preliminary result Specimen: Blood from Arm, Right Updated: 06/22/24 1500     Blood Culture No growth at 2 days    Blood Culture - Blood, Hand, Right [[id number]]  (Normal) Collected: 06/20/24 1434    Lab Status: Preliminary result Specimen: Blood from Hand, Right Updated: 06/22/24 1500     Blood Culture No growth at 2 days    S. Pneumo Ag Urine or CSF - Urine, Urine, Clean Catch [[id number]]  (Normal) Collected: 06/20/24 1421    Lab Status: Final result Specimen: Urine, Clean Catch Updated: 06/20/24 1500     Strep Pneumo Ag Negative    Legionella Antigen, Urine - Urine, Urine, Clean Catch [[id number]]  (Normal) Collected: 06/20/24 1421    Lab Status: Final result Specimen: Urine, Clean Catch Updated: 06/20/24 1459     LEGIONELLA ANTIGEN, URINE Negative    Urine Culture - Urine, Urine, Clean Catch [[id number]] Collected: 06/19/24 2219    Lab Status: Final result Specimen: Urine, Clean Catch Updated: 06/21/24 1015     Urine Culture >100,000 CFU/mL Mixed Bessy Isolated    Narrative:      Specimen contains mixed organisms of questionable pathogenicity suggestive of contamination. If symptoms persist, suggest recollection.  Colonization of the urinary tract without infection is common. Treatment is discouraged unless the patient is symptomatic, pregnant, or undergoing an invasive urologic procedure.            CT Chest With Contrast Diagnostic    Result Date: 6/20/2024  Impression: 1. Patchy groundglass, reticular nodular and nodular densities in a peribronchial distribution in the left upper lobe suggesting an infectious or inflammatory process. Correlate clinically. Short-term follow-up CT may be of  value, if clinically warranted. 2. Additional more discrete indeterminate nodules in the left upper lobe and left lower lobe, measuring up to 7 mm. 6-month follow-up CT recommended per Fleischner guidelines. 3. Mildly prominent precarinal and left prevascular space lymph nodes may be reactive given the pulmonary findings. Attention on follow-up recommended. 4. Pulmonary emphysematous changes and scarring. Emphysema on CT is an independent risk factor for lung cancer. Low dose lung cancer screening should be considered if patient qualifies based on smoking history or if not already enrolled in a screening program. Electronically Signed: Ross Rust MD  6/20/2024 9:54 AM EDT  Workstation ID: [id number]    XR Chest 1 View    Result Date: 6/19/2024  Impression: 1. No acute cardiopulmonary disease. Electronically Signed: Laith Marina MD  6/19/2024 7:26 PM EDT  Workstation ID: [id number]              Results for orders placed during the hospital encounter of 06/19/24    Adult Transthoracic Echo Complete W/ Cont if Necessary Per Protocol    Interpretation Summary    Left ventricular systolic function is low normal. Calculated left ventricular EF = 45.3%    Left ventricular wall thickness is consistent with mild concentric hypertrophy.    Left ventricular diastolic function is consistent with (grade I) impaired relaxation and age.    The right ventricular cavity is mildly dilated with mildly reduced systolic function.    Left atrial volume is moderate to severely increased.    Mild tricuspid valve regurgitation.    Estimated right ventricular systolic pressure from tricuspid regurgitation is markedly elevated (>55 mmHg).      Labs Pending at Discharge:  Pending Labs       Order Current Status    Blood Culture - Blood, Arm, Right Preliminary result    Blood Culture - Blood, Hand, Right Preliminary result              Time spent on Discharge including face to face service: 33 minutes    Electronically signed by Camacho  MD [place], 06/23/24, 9:52 AM EDT.

## 2024-06-23 NOTE — PLAN OF CARE
Goal Outcome Evaluation:  Patient alert and oriented x4. Patient informed PCA that he was having some burning when urinating.  Hospitalist notified, see orders.  Patient with no other needs or concerns at this time.  Patient with no acute events thus far this shift.  Continue plan of care.

## 2024-06-23 NOTE — PROGRESS NOTES
Pulmonary / Critical Care Progress Note      Patient Name: Arron Valencia  : 1940  MRN: [id number]  Attending:  Camacho Dumont MD  Date of admission: 2024    Subjective   Subjective   Follow-up for respiratory failure    Sitting in chair this morning  On 2 L of oxygen  Shortness of breath improving  Cough unchanged.  Secretions are thin and clear  No chest pain or hemoptysis  No nausea, fevers or chills  Appears very weak and frail  Did desaturate on walk test    Objective   Objective     Vitals:   Temp:  [96.8 °F (36 °C)-97.7 °F (36.5 °C)] 97.7 °F (36.5 °C)  Heart Rate:  [59-77] 62  Resp:  [16-18] 16  BP: ()/(62-82) 104/70  Flow (L/min):  [0-2] 2    Physical Exam   Vital Signs Reviewed   General:  WDWN, Alert, NAD.    HEENT:  PERRL, EOMI.  OP, nares clear  Chest:  good aeration, clear to auscultation bilaterally, tympanic to percussion bilaterally, no work of breathing noted  CV: RRR, no MGR, pulses 2+, equal.  Abd:  Soft, NT, ND, + BS, no HSM  EXT:  no clubbing, no cyanosis, no edema  Neuro:  A&Ox3, CN grossly intact, no focal deficits.  Skin: No rashes or lesions noted      Result Review    Result Review:  I have personally reviewed the results from the time of this admission to 2024 06:46 EDT and agree with these findings:  [x]  Laboratory  [x]  Microbiology  [x]  Radiology  [x]  EKG/Telemetry   [x]  Cardiology/Vascular   []  Pathology  []  Old records  []  Other:  Most notable findings include:       Lab 24  0554 24  0537 24  0511 24  1856   WBC 4.82 3.51 4.06 4.62   HEMOGLOBIN 8.9* 9.1* 8.4* 8.3*   HEMATOCRIT 27.9* 29.4* 26.9* 26.2*   PLATELETS 236 198 174 185   SODIUM 136 136 136 134*   POTASSIUM 4.3 4.7 4.3 4.4   CHLORIDE 97* 99 101 98   CO2 26.7 24.4 25.2 24.6   BUN 35* 30* 34* 39*   CREATININE 1.28* 1.30* 1.39* 1.53*   GLUCOSE 129* 137* 92 102*   CALCIUM 9.1 8.9 8.1* 8.2*   PHOSPHORUS 3.1  --   --   --    TOTAL PROTEIN  --   --   --  5.9*   ALBUMIN  --    --   --  3.6   GLOBULIN  --   --   --  2.3     CT Chest With Contrast Diagnostic    Result Date: 6/20/2024  CT CHEST W CONTRAST DIAGNOSTIC Date of Exam: 6/20/2024 9:25 AM EDT Indication: soa. Shortness of air. Comparison: None available. Technique: Axial CT images were obtained of the chest after the uneventful intravenous administration of iodinated contrast.  Reconstructed coronal and sagittal images were also obtained. Automated exposure control and iterative construction methods were  used. Findings: There is mild to moderate upper lung predominant pulmonary emphysematous change. There is biapical scarring. There are patchy groundglass and reticular nodular and nodular densities in a peribronchial distribution in the left upper lobe suggesting an infectious or inflammatory process. Largest single nodule measures 6 mm on image #131 of series 5. There is associated patchy opacification of bronchial segments within these areas suggesting mucous plugging. There is a 3 mm subpleural nodule in the left lower lobe on image #141 of series 5. There is 7 mm subpleural nodule in the left lower lobe on image #239 of series 5. Scattered calcified granulomatous changes are noted. No pleural fluid identified. Pulmonary arteries appear normal caliber. No pulmonary arterial filling defects are evident to suggest PE. Thoracic aorta appears normal caliber. Multivessel scattered coronary calcification is noted. Aortic vascular calcification is noted. Mildly prominent precarinal lymph node measuring 1.2 cm in short axis dimension and prevascular space lymph node measuring 1.4 cm in short axis dimension may be reactive given the pulmonary findings. No axillary or supraclavicular adenopathy identified. Thyroid  is unremarkable. Limited imaging through the upper abdomen demonstrates grossly normal adrenal morphology. Indeterminate low-density lesions in the left renal fossa may be due to cysts but are incompletely characterized.  Visualized adrenals have a grossly normal morphology. There is degenerative change in the spine. No aggressive osseous lesions are identified.     Impression: Impression: 1. Patchy groundglass, reticular nodular and nodular densities in a peribronchial distribution in the left upper lobe suggesting an infectious or inflammatory process. Correlate clinically. Short-term follow-up CT may be of value, if clinically warranted. 2. Additional more discrete indeterminate nodules in the left upper lobe and left lower lobe, measuring up to 7 mm. 6-month follow-up CT recommended per Fleischner guidelines. 3. Mildly prominent precarinal and left prevascular space lymph nodes may be reactive given the pulmonary findings. Attention on follow-up recommended. 4. Pulmonary emphysematous changes and scarring. Emphysema on CT is an independent risk factor for lung cancer. Low dose lung cancer screening should be considered if patient qualifies based on smoking history or if not already enrolled in a screening program. Electronically Signed: Ross Rust MD  6/20/2024 9:54 AM EDT  Workstation ID: [id number]     Results for orders placed during the hospital encounter of 06/19/24    Adult Transthoracic Echo Complete W/ Cont if Necessary Per Protocol    Interpretation Summary    Left ventricular systolic function is low normal. Calculated left ventricular EF = 45.3%    Left ventricular wall thickness is consistent with mild concentric hypertrophy.    Left ventricular diastolic function is consistent with (grade I) impaired relaxation and age.    The right ventricular cavity is mildly dilated with mildly reduced systolic function.    Left atrial volume is moderate to severely increased.    Mild tricuspid valve regurgitation.    Estimated right ventricular systolic pressure from tricuspid regurgitation is markedly elevated (>55 mmHg).    Culture so far negative    Assessment & Plan   Assessment / Plan     Active Hospital Problems:  Active  Hospital Problems    Diagnosis     **Generalized weakness     Pneumonia      Impression:  Abnormal chest CT  Acute hypoxic respiratory failure  Community-acquired pneumonia, unspecified organism  Mediastinal adenopathy, suspect reactive  Multiple lung nodules, suspect inflammatory nodules  COPD/emphysema with acute exacerbation  Acute kidney injury secondary to prerenal etiology  Acute anemia  Generalized weakness     Plan:  Wean O2 to keep SPO2 greater than 90%.  Desaturated on walk test on room air.  Recommend discharge with exertional oxygen at 2 L/min  Chest CT consistent with inflammation/infection with reactive adenopathy.  Repeat in 8 weeks as an outpatient to ensure resolution  Completed 4 days of ceftriaxone.  Complete Z-Chuck  Finish 5 days of prednisone 40 mg daily  Respiratory viral panel and all cultures negative  Procalcitonin negative  Continue nebulizers and aggressive bronchopulmonary hygiene  Encourage activity and incentive spirometer use  Echocardiogram with diastolic dysfunction.  Appears euvolemic at this time.    Stable to discharge from my perspective    VTE Prophylaxis:  Mechanical VTE prophylaxis orders are present.    CODE STATUS:   Code Status (Patient has no pulse and is not breathing): CPR (Attempt to Resuscitate)  Medical Interventions (Patient has pulse or is breathing): Full Support      Labs, imaging, microbiology, notes and medications personally reviewed  Discussed with primary    Electronically signed by Kun Song MD, 06/23/24, 10:38 AM EDT.

## 2024-06-23 NOTE — PLAN OF CARE
Goal Outcome Evaluation:  Plan of Care Reviewed With: patient           Outcome Evaluation: pt is alert and orient x4.  Insulin adm as ordered.  VS WNL for pt.  Pt denies any pain/discomfort.  Pt to discharge home with home health this shift.

## 2024-06-23 NOTE — SIGNIFICANT NOTE
06/20/24 1147   Physical Therapy Time and Intention   Session Not Performed patient unavailable for evaluation  (out of room)       
   06/23/24 1109   Physical Therapy Time and Intention   Session Not Performed   (Pt has orders to discharge today.)       
You can access the FollowMyHealth Patient Portal offered by Health system by registering at the following website: [email or web address]. By joining [employer]’s FollowMyHealth portal, you will also be able to view your health information using other applications (apps) compatible with our system.

## 2024-06-24 ENCOUNTER — READMISSION MANAGEMENT (OUTPATIENT)
Dept: CALL CENTER | Facility: HOSPITAL | Age: 84
End: 2024-06-24
Payer: MEDICARE

## 2024-06-24 LAB
QT INTERVAL: 415 MS
QT INTERVAL: 435 MS
QTC INTERVAL: 464 MS
QTC INTERVAL: 481 MS

## 2024-06-24 NOTE — OUTREACH NOTE
Prep Survey      Flowsheet Row Responses   [facility] facility patient discharged from? [facility]   Is LACE score < 7 ? No   Eligibility Readm Mgmt   Discharge diagnosis Generalized weakness   Does the patient have one of the following disease processes/diagnoses(primary or secondary)? Other   Does the patient have Home health ordered? Yes   What is the Home health agency?  [facility]   Is there a DME ordered? Yes   What DME was ordered? [employer]--oxygen   Medication alerts for this patient see avs   Prep survey completed? Yes            Jo-Ann CORRALES - Registered Nurse

## 2024-06-25 LAB
BACTERIA SPEC AEROBE CULT: NORMAL
BACTERIA SPEC AEROBE CULT: NORMAL

## 2024-06-26 ENCOUNTER — READMISSION MANAGEMENT (OUTPATIENT)
Dept: CALL CENTER | Facility: HOSPITAL | Age: 84
End: 2024-06-26
Payer: MEDICARE

## 2024-06-26 NOTE — OUTREACH NOTE
Medical Week 1 Survey      Flowsheet Row Responses   [facility] facility patient discharged from? [facility]   Does the patient have one of the following disease processes/diagnoses(primary or secondary)? Other   Week 1 attempt successful? No   Unsuccessful attempts Attempt 1            Bry NEVILLE - Registered Nurse

## 2024-06-28 ENCOUNTER — TELEPHONE (OUTPATIENT)
Dept: GASTROENTEROLOGY | Facility: CLINIC | Age: 84
End: 2024-06-28
Payer: MEDICARE

## 2024-07-12 ENCOUNTER — READMISSION MANAGEMENT (OUTPATIENT)
Dept: CALL CENTER | Facility: HOSPITAL | Age: 84
End: 2024-07-12
Payer: MEDICARE

## 2024-07-12 NOTE — OUTREACH NOTE
Medical Week 3 Survey      Flowsheet Row Responses   [facility] facility patient discharged from? [facility]   Does the patient have one of the following disease processes/diagnoses(primary or secondary)? Other   Week 3 attempt successful? No   Unsuccessful attempts Attempt 1            Shaneka RENTERIA - Registered Nurse

## 2024-07-17 ENCOUNTER — READMISSION MANAGEMENT (OUTPATIENT)
Dept: CALL CENTER | Facility: HOSPITAL | Age: 84
End: 2024-07-17
Payer: MEDICARE

## 2024-07-17 NOTE — OUTREACH NOTE
Medical Week 3 Survey      Flowsheet Row Responses   [facility] facility patient discharged from? [facility]   Does the patient have one of the following disease processes/diagnoses(primary or secondary)? Other   Week 3 attempt successful? No   Unsuccessful attempts Attempt 2            Shaneka AREVALO - Registered Nurse